# Patient Record
Sex: MALE | Race: WHITE | NOT HISPANIC OR LATINO | Employment: FULL TIME | URBAN - METROPOLITAN AREA
[De-identification: names, ages, dates, MRNs, and addresses within clinical notes are randomized per-mention and may not be internally consistent; named-entity substitution may affect disease eponyms.]

---

## 2018-03-14 ENCOUNTER — HISTORICAL (OUTPATIENT)
Dept: LAB | Facility: HOSPITAL | Age: 48
End: 2018-03-14

## 2018-03-14 LAB
ABS NEUT (OLG): 4.77 X10(3)/MCL (ref 2.1–9.2)
ALBUMIN SERPL-MCNC: 3.8 GM/DL (ref 3.4–5)
ALBUMIN/GLOB SERPL: 1.2 {RATIO}
ALP SERPL-CCNC: 27 UNIT/L (ref 50–136)
ALT SERPL-CCNC: 64 UNIT/L (ref 12–78)
AST SERPL-CCNC: 47 UNIT/L (ref 15–37)
BASOPHILS # BLD AUTO: 0 X10(3)/MCL (ref 0–0.2)
BASOPHILS NFR BLD AUTO: 1 %
BILIRUB SERPL-MCNC: 0.5 MG/DL (ref 0.2–1)
BILIRUBIN DIRECT+TOT PNL SERPL-MCNC: 0.2 MG/DL (ref 0–0.2)
BILIRUBIN DIRECT+TOT PNL SERPL-MCNC: 0.3 MG/DL (ref 0–0.8)
BUN SERPL-MCNC: 24 MG/DL (ref 7–18)
CALCIUM SERPL-MCNC: 8.6 MG/DL (ref 8.5–10.1)
CHLORIDE SERPL-SCNC: 99 MMOL/L (ref 98–107)
CO2 SERPL-SCNC: 31 MMOL/L (ref 21–32)
CREAT SERPL-MCNC: 1.35 MG/DL (ref 0.7–1.3)
EOSINOPHIL # BLD AUTO: 0.1 X10(3)/MCL (ref 0–0.9)
EOSINOPHIL NFR BLD AUTO: 2 %
ERYTHROCYTE [DISTWIDTH] IN BLOOD BY AUTOMATED COUNT: 12.9 % (ref 11.5–17)
GLOBULIN SER-MCNC: 3.1 GM/DL (ref 2.4–3.5)
GLUCOSE SERPL-MCNC: 77 MG/DL (ref 74–106)
HCT VFR BLD AUTO: 52.3 % (ref 42–52)
HGB BLD-MCNC: 16.7 GM/DL (ref 14–18)
LYMPHOCYTES # BLD AUTO: 0.6 X10(3)/MCL (ref 0.6–4.6)
LYMPHOCYTES NFR BLD AUTO: 10 %
MCH RBC QN AUTO: 30.5 PG (ref 27–31)
MCHC RBC AUTO-ENTMCNC: 31.9 GM/DL (ref 33–36)
MCV RBC AUTO: 95.6 FL (ref 80–94)
MONOCYTES # BLD AUTO: 0.6 X10(3)/MCL (ref 0.1–1.3)
MONOCYTES NFR BLD AUTO: 10 %
NEUTROPHILS # BLD AUTO: 4.77 X10(3)/MCL (ref 1.4–7.9)
NEUTROPHILS NFR BLD AUTO: 76 %
PLATELET # BLD AUTO: 352 X10(3)/MCL (ref 130–400)
PMV BLD AUTO: 10.2 FL (ref 9.4–12.4)
POTASSIUM SERPL-SCNC: 4.4 MMOL/L (ref 3.5–5.1)
PROT SERPL-MCNC: 6.9 GM/DL (ref 6.4–8.2)
PSA SERPL-MCNC: 1.03 NG/ML (ref 0–4)
RBC # BLD AUTO: 5.47 X10(6)/MCL (ref 4.7–6.1)
SODIUM SERPL-SCNC: 136 MMOL/L (ref 136–145)
WBC # SPEC AUTO: 6.2 X10(3)/MCL (ref 4.5–11.5)

## 2018-07-16 ENCOUNTER — HISTORICAL (OUTPATIENT)
Dept: SURGERY | Facility: HOSPITAL | Age: 48
End: 2018-07-16

## 2018-07-16 LAB
ABS NEUT (OLG): 6.2 X10(3)/MCL (ref 1.5–6.9)
ALBUMIN SERPL-MCNC: 4 GM/DL (ref 3.4–5)
ALBUMIN/GLOB SERPL: 1.2 RATIO
ALP SERPL-CCNC: 29 UNIT/L (ref 30–113)
ALT SERPL-CCNC: 138 UNIT/L (ref 10–45)
APTT PPP: 22.4 SECOND(S) (ref 25–35)
AST SERPL-CCNC: 48 UNIT/L (ref 15–37)
BILIRUB SERPL-MCNC: 0.7 MG/DL (ref 0.1–0.9)
BILIRUBIN DIRECT+TOT PNL SERPL-MCNC: 0.2 MG/DL (ref 0–0.3)
BILIRUBIN DIRECT+TOT PNL SERPL-MCNC: 0.5 MG/DL
BUN SERPL-MCNC: 16 MG/DL (ref 10–20)
CALCIUM SERPL-MCNC: 9.7 MG/DL (ref 8–10.5)
CHLORIDE SERPL-SCNC: 104 MMOL/L (ref 100–108)
CO2 SERPL-SCNC: 29 MMOL/L (ref 21–35)
CREAT SERPL-MCNC: 1.23 MG/DL (ref 0.7–1.3)
ERYTHROCYTE [DISTWIDTH] IN BLOOD BY AUTOMATED COUNT: 14.7 % (ref 11.5–17)
GLOBULIN SER-MCNC: 3.4 GM/DL
GLUCOSE SERPL-MCNC: 86 MG/DL (ref 75–116)
HCT VFR BLD AUTO: 54.3 % (ref 42–52)
HGB BLD-MCNC: 17.6 GM/DL (ref 14–18)
INR PPP: 1 (ref 0–1.2)
MCH RBC QN AUTO: 29 PG (ref 27–34)
MCHC RBC AUTO-ENTMCNC: 32 GM/DL (ref 31–36)
MCV RBC AUTO: 90 FL (ref 80–99)
PLATELET # BLD AUTO: 409 X10(3)/MCL (ref 140–400)
PMV BLD AUTO: 10.7 FL (ref 6.8–10)
POTASSIUM SERPL-SCNC: 4.2 MMOL/L (ref 3.6–5.2)
PROT SERPL-MCNC: 7.4 GM/DL (ref 6.4–8.2)
PROTHROMBIN TIME: 10.5 SECOND(S) (ref 9–12)
RBC # BLD AUTO: 6.01 X10(6)/MCL (ref 4.7–6.1)
SODIUM SERPL-SCNC: 140 MMOL/L (ref 135–145)
WBC # SPEC AUTO: 8.9 X10(3)/MCL (ref 4.5–11.5)

## 2018-07-19 ENCOUNTER — HISTORICAL (OUTPATIENT)
Dept: MEDSURG UNIT | Facility: HOSPITAL | Age: 48
End: 2018-07-19

## 2021-02-01 ENCOUNTER — HISTORICAL (OUTPATIENT)
Dept: RADIOLOGY | Facility: HOSPITAL | Age: 51
End: 2021-02-01

## 2021-10-28 ENCOUNTER — HISTORICAL (OUTPATIENT)
Dept: SURGERY | Facility: HOSPITAL | Age: 51
End: 2021-10-28

## 2021-10-28 LAB
APPEARANCE, UA: CLEAR
BACTERIA SPEC CULT: ABNORMAL /HPF
BILIRUB UR QL STRIP: NEGATIVE
COLOR UR: YELLOW
GLUCOSE (UA): NEGATIVE MG/DL
HGB UR QL STRIP: ABNORMAL
KETONES UR QL STRIP: NEGATIVE MG/DL
LEUKOCYTE ESTERASE UR QL STRIP: NEGATIVE
NITRITE UR QL STRIP: NEGATIVE
PH UR STRIP: 7 [PH] (ref 4.6–8)
PROT UR QL STRIP: 30 MG/DL
RBC #/AREA URNS HPF: ABNORMAL /HPF
SARS-COV-2 AG RESP QL IA.RAPID: NOT DETECTED
SP GR UR STRIP: 1.02 (ref 1–1.03)
SQUAMOUS EPITHELIAL, UA: ABNORMAL
UROBILINOGEN UR STRIP-ACNC: 1 EU/DL
WBC #/AREA URNS HPF: ABNORMAL /[HPF]

## 2021-11-02 ENCOUNTER — HISTORICAL (OUTPATIENT)
Dept: ANESTHESIOLOGY | Facility: HOSPITAL | Age: 51
End: 2021-11-02

## 2022-04-10 ENCOUNTER — HISTORICAL (OUTPATIENT)
Dept: ADMINISTRATIVE | Facility: HOSPITAL | Age: 52
End: 2022-04-10

## 2022-04-30 VITALS
BODY MASS INDEX: 31.22 KG/M2 | WEIGHT: 218.06 LBS | HEIGHT: 70 IN | DIASTOLIC BLOOD PRESSURE: 77 MMHG | SYSTOLIC BLOOD PRESSURE: 135 MMHG

## 2022-04-30 NOTE — OP NOTE
Patient:   Huang Ramsey             MRN: [id number]            FIN: [id number]               Age:   48 years     Sex:  Male     :  1970   Associated Diagnoses:   None   Author:   Pradip Zuleta MD      Date of procedure: 2018    Preoperative diagnosis: Obstructive sleep apnea.  Septal deviation.  Bilateral inferior turbinate hypertrophy.  Tonsil hypertrophy.    Postoperative diagnosis: Same    Procedure: Septoplasty, bilateral inferior turbinectomy, tonsillectomy, and uvulopalatopharyngoplasty.    Surgeon: Pradip Zuleta M.D.    Findings: Patient had bilateral inferior turbinate hypertrophy with partial obstruction of the nasal cavities.  The patient appeared to have had septoplasty previously due to scarring within portion of the nasal septum.  He did have some remnants of septal cartilage and bone and spurring of the maxillary crest which contributed to left sided nasal obstruction.  2+ tonsillar hypertrophy.  He had elongation of the soft palate and uvula with lateral webbing of the posterior tonsillar pillars.    Specimens: Septal cartilage and bone, right and left inferior turbinates, right and left palatine tonsils, and uvula and portions of the soft palate.    Estimated blood loss: Less than 50 mL    Complications: None.    Drains: None.    Anesthesia: Gen. endotracheal anesthesia    Indications: Please see history and physical exam    Procedure: Patient was brought to the operating room and placed on the operating room table in supine position after which general endotracheal anesthesia was induced.  Both inferior turbinates and the nasal septum were infiltrated with 2% lidocaine with 1-100,000 epinephrine for hemostasis.  Both inferior turbinates were also injected with saline solution for hemostasis.  Surgical patties saturated with 4% cocaine were then placed intranasally bilaterally.  Patient was then prepped and draped in sterile fashion.  The septoplasty was addressed 1st.   The surgical patties were then removed.  Examination of the nasal septum showed there to be some spurring along the anterior maxillary crest and some deviation of the nasal septum superiorly which contributed to the patient's obstruction.  A hemitransfixion incision was made on the left and a mucoperichondrial and periosteal flaps were then elevated.  As the flaps were elevated it was found that portions of the septal cartilage and bone were missing and that the mucosal flaps were coapted by fibrous adhesions.  These adhesions were divided allowing further exposure of the remnants of the septal cartilage and bone superiorly.  There was a remnant of septal cartilage attached superiorly to the perpendicular plate of the ethmoid remnant.  The septal cartilage in this area was  from the perpendicular plate of the ethmoid with caudal elevator and then a mucoperiosteal flap was elevated on the left.  The posterior portion of the upper nasal septum was also bowed obstructing the upper portion of the left anterior nasal cavity.  This portion of cartilage was removed using septal scissors taking care to leave an adequate amount of cartilage for caudal and dorsal strut.  Remnant of the perpendicular plate of the ethmoid which was contributing also to the obstruction was also removed using alligator forceps.  Next elevation of the mucoperiosteum off of the maxillary crest was carried out and the anterior portion of the maxillary crest which had these septal spurring was resected using osteotome.  The nasal cavity was examined and the obstruction from the septal deviation was relieved.  There was still slight bowing of the anterior septal cartilage but discussed with patient that it was important to leave this cartilage in place to maintain support for the tip of the nose and this area was left undisturbed.  The hemitransfixion incision was closed in a single layer using interrupted 5-0 chromic catgut suture.  The  septal flaps were then reapproximated using a running 40 plain gut horizontal mattress suture.  Next attention was turned to the inferior turbinates.  Both the right and left inferior turbinates were treated in a similar manner with findings being similar.  A straight hemostat was placed across the transition zone between the horizontal and vertical portions of the inferior turbinates and the hemostat was closed.  This was left closed for approximately 1 minute to assist with hemostasis.  The hemostats were then removed.  Turbinectomy was then carried out along the transition zone using scissors and also under direct visualization for port of the turbinectomy with a 0° endoscope.  The incision was extended anteriorly all the way to the posterior aspect of the inferior turbinate.  Inferior turbinates were then removed and submitted for pathologic evaluation.  The remnant of the inferior turbinates were then cauterized along the turbinectomy or hemostasis.  Lip was then evacuated from the nasal cavity and nasopharynx.  Next a magnetic septal splint was placed and secured across the columella with suture.  Next attention was turned to the tonsillectomy and uvulopalatopharyngoplasty.  A Myke-Farhad mouthgag was inserted into the oral cavity and opened and suspended from the [place] stand.  Red rubber catheter was passed through the right nostril and brought out through the mouth and used to retract the soft palate.  Examination of the oropharynx showed the above-mentioned findings.  Examination of the nasopharynx did not show any adenoid hypertrophy or other obstruction.  The tonsils were addressed 1st.  They were retracted medially with Allis forceps.  Incisions were then made in the anterior tonsillar pillars with plasma wand.  Coblation technique was used for the tonsillectomy and the uvulopalatopharyngoplasty.  The tonsils were removed from the pharyngeal wall using plasma wand.  Vessels were cauterized as they were  encountered.  Next an incision was made along the lower portions of the soft palate extending between the superior tonsillar fossa.  The incision was then beveled working in a anterior superior to posterior anterior direction beveling the flap.  The uvula was also resected with this portion of the soft palate and it was also beveled.  Next an incision was made in the posterior tonsillar pillar mucosa superiorly at its junction with the soft palate.  This allowed closure without tension.  The tonsillar pillars and soft palate incision were then closed in layers using interrupted 3-0 Vicryl suture to reapproximate the submucosal tissues and muscles and the mucosa was then also closed using interrupted 3-0 Vicryl suture.  Next the oral cavity and oropharynx were irrigated with saline.  Both tonsillar fossa and the incision line along the soft palate were infiltrated with 0.5% Marcaine with epinephrine.  A drip pad was then placed under the nose.  The procedure was then terminated and the patient was awoken and brought to the recovery room in stable condition.  The patient tolerated the procedure well.    CC: Dr. Pradeep Do

## 2022-04-30 NOTE — H&P
Patient:   Huang Ramsey             MRN: [id number]            FIN: [id number]               Age:   48 years     Sex:  Male     :  1970   Associated Diagnoses:   None   Author:   Pradip Zuleta MD      Patient: Huang Ramsey  YOB: 1940  Age: 48y  Gender: Male  Referring Physician: Pradeep Do MD    Chief Complaint: Sleep apnea    Historian: patient who is a good historian.     Present Illness:   48-year-old white male who was diagnosed with obstructive sleep apnea in , is referred for evaluation for surgical treatment of his sleep apnea.  The patient was initially diagnosed with obstructive sleep apnea when he had undergone a sleep study with a split night protocol on 2014.  Review of his results showed an have an apnea-hypopnea index of 24 with his lowest O2 saturation being 87%.  He had a normal sinus rhythm with an occasional premature atrial contraction.  He was eventually titrated to a continuous positive airway pressure of 12 cm on CPAP testing.  He was initially treated with CPAP but he did not tolerate it well and discontinued its use after only a few months.  He found that he would frequently awaken at night having taken the mask off.  The patient is interested in pursuing other treatment options.  In regards to symptoms of patient does snore loudly at night and it is so severe that the patient has the patient sleeping in another room separate from her.  She can hear him snoring from several rooms over.  The patient will usually go to bed at 8:52 PM and is usually up at 6 AM in the morning.  Frequently when he awakens he is not well rested.  He finds that at lunch she will sometimes have to take a nap.  His wife has noticed that the patient does have periods of apnea.  The patient does have problems with daytime somnolence.  Sometimes when driving he will become sleepy on a longer road trip such as driving from here to [place].  If she becomes sleepy  will pull over on the side of the road and take a 15-20 minute nap.  At one time he did this he had slept for approximately 2 hours.  Sometimes this sleepiness will affect his ability to work.  Patient does find that he is irritable at times.  He does not have problems with headaches.  His weight has been stable ranging from 210-215 pounds.  The patient does have problems with chronic nasal congestion which has been present for several years.  This is worse during the spring.  He does have a history of nasal trauma for which he had undergone repair in 1993.  In the past he had used an over-the-counter decongestant nasal spray fairly frequently because of this congestion.  He did notice that once he was able to stop it that his breathing had improved.  He is still having problems with congestion, however.  The patient does not have a history of tonsillitis.  He does exercise frequently.  He does not use tobacco.  He drinks alcohol infrequently.  He does not have a history of heart disease but does have a history of hypertension which was diagnosed earlier this year.    Past Medical History:  High blood pressure.       Past Surgical History:   Appendectomy.   rotator cuff sx 2014  ACL Repair 2016  Bicep reattachment 2007    Current Medications:  Performed Reconciliation  Active Medications   Valsartan/HCTZ 320mg/25mg. Take 1 tablet(s) By Mouth Daily. Do not substitute. (null).   Lexapro  . Take 1 20mg tablet(s) By Mouth Daily. Do not substitute. (null).      Allergies:  Performed Reconciliation   Penicillin     Review of Systems:  Review of systems is unremarkable except as mentioned above.     Social History:  Patient has never used tobacco. Patient drinks 2-3 beers a weekPatient denies the use of illicit drugs. Patient is currently employed. Patient is .     Family History:  There is no family history of bleeding diathesis.   There is no family history of anesthetic complications.   There is a family  history of heart disease.   There is a family history of hypertension.   History of cancer.     Vitals:   Weight: 221.0 lbs  Temperature: 99.1° F  Heart rate: 100 bpm  Blood pressure: 120 / 73 mmHg      Physical Exam:  General: Well developed & well nourished male in no acute distress. Voice is normal.   Head & Face: Normocephalic without any apparent skin cancer, facial swelling, masses, or erythema.   Ears:  Auricles are normally developed & without lesions, externally auditory canals are normal, & both tympanic membranes are non-erythematous. No middle ear effusions.   Nose: Nasal dorsum is unremarkable. Nasal septum deviates to the left.   Right nasal cavity: Inferior turbinate is hypertrophic.   Left nasal cavity: Inferior turbinate is hypertrophic.   Oral cavity & oropharynx: Floor of mouth is unremarkable.Hard palate is normal. Tongue is slightly enlarged.  The patient does have elongation of the soft palate and uvula with webbing of the posterior tonsillar pillars bilaterally.  2+ tonsillar hypertrophy bilaterally.  Neck: Neck is supple without adenopathy, thyromegaly, swelling or tenderness. Trachea is in the midline. Parotid & submandibular glands are non-tender & without swelling, tenderness, or masses.   Chest:  Clear to auscultation. No adventitial sounds.   Cardiovascular: Regular rate & rhythm. No murmurs.   Abdomen: Non-distended.   Extremities: No cyanosis, clubbing , or edema.   Eyes: Extraocular muscles are intact.   Neurologic: Alert & oriented. Cranial nerves 2- 12 are grossly normal.     Assessment:  Obstructive sleep apnea.  Septal deviation and bilateral inferior turbinate hypertrophy.    Plan:  Treatment options were discussed with the patient.  From a surgical standpoint and believe he would benefit from septoplasty, turbinectomy, tonsillectomy, and uvulopalatopharyngoplasty.  Risks of this procedure and complications were discussed.  Was discussed with the patient that I believe that this  will help his sleep apnea but may not completely eliminated.  Also believe it would help his snoring but that it may not completely eliminated.  The patient does understand this and would like to proceed with surgery.  Surgery is tentatively scheduled for 07/19/2018.

## 2022-04-30 NOTE — OP NOTE
Patient:   Huang Ramsey            MRN: [id number]            FIN: [id number]               Age:   51 years     Sex:  Male     :  1970   Associated Diagnoses:   Calculus of gallbladder with chronic cholecystitis without obstruction   Author:   Sumi Hurtado MD      Operative Note   Operative Information   Date/ Time:  2021 14:11:00.     Procedures Performed: Procedure Code   Laparoscopy, surgical; cholecystectomy (39373)..     Indications: 51-year-old white male with pain in the right upper quadrant and symptoms consistent with chronic cholecystitis and imaging evidence consistent with chronic cholecystitis and gallstones elected to undergo cholecystectomy.     Preoperative Diagnosis: Calculus of gallbladder with chronic cholecystitis without obstruction (DKX17-VI K80.10).     Postoperative Diagnosis: Calculus of gallbladder with chronic cholecystitis without obstruction ([id number] K80.10).     Surgeon: Sumi Hurtado MD.     Anesthesia: General.     Speciman Removed: Gallbladder.     Description of Procedure/Findings/    Complications: After the proper consent was obtained patient was brought to the operating theater laid in the supine position general endotracheal intubation and anesthesia was performed.  An NG tube was placed prior to beginning the procedure and the abdomen was sterilely prepped and draped in normal surgical fashion.  An incision was made in the supraumbilical region after infiltration 1% lidocaine and epinephrine using a #15 blade.  Dissection was carried down to the level of the fascia and the abdomen was entered carefully using a  Veress needle.  The abdomen was then insufflated to 15 mmHg,  a 5 mm Visiport was used to enter the abdomen and the abdominal contents were visually inspected.  Under direct visualization secondary trochars were inserted into the abdomen, a 5 mm trocar was inserted in the subxiphoid region and 2 5 mm trochars were inserted and the right  quadrant.  There were no injuries noted during insertion of the trochars.  The patient was then placed in reverse Trendelenburg position with right side up.  There were firm adhesions between the gallbladder and omentum which were lysed sharply.   The dome of the gallbladder was then grasped with atraumatic grasper through the most lateral port and retracted over the dome of the liver the infundibulum was then grasped using an atraumatic grasper through the midclavicular port and retracted to the right lower quadrant.  This was able to expose the triangle of calot.  The peritoneum overlying the gallbladder infundibulum was then incised and the cystic duct and artery were identified and circumferentially dissected carefully.   This created the critical view of the cystic duct and artery entering clearly into the gallbladder with the liver in the background.   The cystic duct and cystic artery were then doubly clipped and divided close to the gallbladder. The gallbladder was then dissected from its peritoneal attachments using electrocautery.   Hemostasis was checked and the gallbladder was placed within an endoscopic retrieval bag and removed through the subxiphoid port.  The gallbladder was then passed off the table as a specimen.  The gallbladder fossa was irrigated with saline to assure hemostasis.  No evidence of bleeding from the fossa or the cystic artery were identified.  There was no leakage noted from the cystic stump.  Secondary trochars were then removed under direct visualization.  There was no noted bleeding at any of the trocar sites.  The laparoscope was then withdrawn through the umbilical port and insufflation was allowed to escape.  The fascia of the subxiphoid port were closed with a simple figure-of-eight 0 Vicryl suture.  The skin was closed in a subcuticular fashion using 4-0 Monocryl in a sterile dressing was placed upon the skin.  The patient tolerated the procedure well and was transferred to  the postanesthesia care unit in stable condition..     Esimated blood loss: loss  5  cc.     Complications: None.

## 2023-09-29 ENCOUNTER — HOSPITAL ENCOUNTER (OUTPATIENT)
Dept: RADIOLOGY | Facility: HOSPITAL | Age: 53
Discharge: HOME | End: 2023-09-29
Attending: FAMILY MEDICINE
Payer: COMMERCIAL

## 2023-09-29 DIAGNOSIS — R06.02 SHORTNESS OF BREATH: ICD-10-CM

## 2023-09-29 PROCEDURE — 71046 X-RAY EXAM CHEST 2 VIEWS: CPT | Mod: TC

## 2023-10-22 ENCOUNTER — HOSPITAL ENCOUNTER (INPATIENT)
Facility: HOSPITAL | Age: 53
LOS: 4 days | Discharge: STILL ADMITTED OR ADMINISTRATIVE | DRG: 291 | End: 2023-10-28
Attending: EMERGENCY MEDICINE | Admitting: EMERGENCY MEDICINE
Payer: COMMERCIAL

## 2023-10-22 DIAGNOSIS — R00.0 TACHYCARDIA: ICD-10-CM

## 2023-10-22 DIAGNOSIS — R79.89 ELEVATED TROPONIN I MEASUREMENT: ICD-10-CM

## 2023-10-22 DIAGNOSIS — I50.9 CHF (CONGESTIVE HEART FAILURE): ICD-10-CM

## 2023-10-22 DIAGNOSIS — R06.00 DYSPNEA: ICD-10-CM

## 2023-10-22 DIAGNOSIS — R00.9 ABNORMAL HEART RATE: ICD-10-CM

## 2023-10-22 DIAGNOSIS — I50.9 CONGESTIVE HEART FAILURE, UNSPECIFIED HF CHRONICITY, UNSPECIFIED HEART FAILURE TYPE: Primary | ICD-10-CM

## 2023-10-22 LAB
BASOPHILS # BLD AUTO: 0.06 X10(3)/MCL
BASOPHILS NFR BLD AUTO: 0.7 %
EOSINOPHIL # BLD AUTO: 0.31 X10(3)/MCL (ref 0–0.9)
EOSINOPHIL NFR BLD AUTO: 3.6 %
ERYTHROCYTE [DISTWIDTH] IN BLOOD BY AUTOMATED COUNT: 13.8 % (ref 11.5–17)
HCT VFR BLD AUTO: 53 % (ref 42–52)
HGB BLD-MCNC: 17.6 G/DL (ref 14–18)
IMM GRANULOCYTES # BLD AUTO: 0.02 X10(3)/MCL (ref 0–0.04)
IMM GRANULOCYTES NFR BLD AUTO: 0.2 %
LYMPHOCYTES # BLD AUTO: 1.75 X10(3)/MCL (ref 0.6–4.6)
LYMPHOCYTES NFR BLD AUTO: 20.2 %
MCH RBC QN AUTO: 31.6 PG (ref 27–31)
MCHC RBC AUTO-ENTMCNC: 33.2 G/DL (ref 33–36)
MCV RBC AUTO: 95.2 FL (ref 80–94)
MONOCYTES # BLD AUTO: 0.75 X10(3)/MCL (ref 0.1–1.3)
MONOCYTES NFR BLD AUTO: 8.7 %
NEUTROPHILS # BLD AUTO: 5.78 X10(3)/MCL (ref 2.1–9.2)
NEUTROPHILS NFR BLD AUTO: 66.6 %
PLATELET # BLD AUTO: 215 X10(3)/MCL (ref 130–400)
PMV BLD AUTO: 10.6 FL (ref 7.4–10.4)
RBC # BLD AUTO: 5.57 X10(6)/MCL (ref 4.7–6.1)
WBC # SPEC AUTO: 8.67 X10(3)/MCL (ref 4.5–11.5)

## 2023-10-22 PROCEDURE — 99285 EMERGENCY DEPT VISIT HI MDM: CPT | Mod: 25

## 2023-10-22 PROCEDURE — 84484 ASSAY OF TROPONIN QUANT: CPT | Performed by: EMERGENCY MEDICINE

## 2023-10-22 PROCEDURE — 82550 ASSAY OF CK (CPK): CPT | Performed by: EMERGENCY MEDICINE

## 2023-10-22 PROCEDURE — 93010 ELECTROCARDIOGRAM REPORT: CPT | Mod: ,,, | Performed by: STUDENT IN AN ORGANIZED HEALTH CARE EDUCATION/TRAINING PROGRAM

## 2023-10-22 PROCEDURE — 63600175 PHARM REV CODE 636 W HCPCS: Performed by: EMERGENCY MEDICINE

## 2023-10-22 PROCEDURE — 80048 BASIC METABOLIC PNL TOTAL CA: CPT | Performed by: EMERGENCY MEDICINE

## 2023-10-22 PROCEDURE — 93005 ELECTROCARDIOGRAM TRACING: CPT

## 2023-10-22 PROCEDURE — 82553 CREATINE MB FRACTION: CPT | Performed by: EMERGENCY MEDICINE

## 2023-10-22 PROCEDURE — 25000003 PHARM REV CODE 250: Performed by: EMERGENCY MEDICINE

## 2023-10-22 PROCEDURE — 93010 EKG 12-LEAD: ICD-10-PCS | Mod: ,,, | Performed by: STUDENT IN AN ORGANIZED HEALTH CARE EDUCATION/TRAINING PROGRAM

## 2023-10-22 PROCEDURE — 85379 FIBRIN DEGRADATION QUANT: CPT | Performed by: EMERGENCY MEDICINE

## 2023-10-22 PROCEDURE — 83880 ASSAY OF NATRIURETIC PEPTIDE: CPT | Performed by: EMERGENCY MEDICINE

## 2023-10-22 PROCEDURE — 96374 THER/PROPH/DIAG INJ IV PUSH: CPT

## 2023-10-22 PROCEDURE — 85025 COMPLETE CBC W/AUTO DIFF WBC: CPT | Performed by: EMERGENCY MEDICINE

## 2023-10-22 PROCEDURE — 83735 ASSAY OF MAGNESIUM: CPT | Performed by: EMERGENCY MEDICINE

## 2023-10-22 PROCEDURE — 96375 TX/PRO/DX INJ NEW DRUG ADDON: CPT

## 2023-10-22 RX ORDER — ASPIRIN 325 MG
325 TABLET ORAL
Status: COMPLETED | OUTPATIENT
Start: 2023-10-22 | End: 2023-10-22

## 2023-10-22 RX ORDER — LORAZEPAM 2 MG/ML
1 INJECTION INTRAMUSCULAR
Status: COMPLETED | OUTPATIENT
Start: 2023-10-22 | End: 2023-10-22

## 2023-10-22 RX ADMIN — ASPIRIN 325 MG ORAL TABLET 325 MG: 325 PILL ORAL at 11:10

## 2023-10-22 RX ADMIN — LORAZEPAM 1 MG: 2 INJECTION INTRAMUSCULAR; INTRAVENOUS at 11:10

## 2023-10-23 LAB
ANION GAP SERPL CALC-SCNC: 14 MEQ/L
APPEARANCE UR: CLEAR
BACTERIA #/AREA URNS AUTO: NORMAL /HPF
BILIRUB UR QL STRIP.AUTO: NEGATIVE
BNP BLD-MCNC: 445.2 PG/ML
BUN SERPL-MCNC: 32 MG/DL (ref 8.4–25.7)
CALCIUM SERPL-MCNC: 9.2 MG/DL (ref 8.4–10.2)
CHLORIDE SERPL-SCNC: 106 MMOL/L (ref 98–107)
CK MB SERPL-MCNC: 4.9 NG/ML
CK SERPL-CCNC: 500 U/L (ref 30–200)
CO2 SERPL-SCNC: 22 MMOL/L (ref 22–29)
COLOR UR AUTO: YELLOW
CREAT SERPL-MCNC: 1.54 MG/DL (ref 0.73–1.18)
CREAT/UREA NIT SERPL: 21
D DIMER PPP IA.FEU-MCNC: 1.01 UG/ML FEU (ref 0–0.5)
ERYTHROCYTE [SEDIMENTATION RATE] IN BLOOD: 13 MM/HR (ref 0–15)
GFR SERPLBLD CREATININE-BSD FMLA CKD-EPI: 54 MLS/MIN/1.73/M2
GLUCOSE SERPL-MCNC: 120 MG/DL (ref 74–100)
GLUCOSE UR QL STRIP.AUTO: NEGATIVE
KETONES UR QL STRIP.AUTO: NEGATIVE
LEUKOCYTE ESTERASE UR QL STRIP.AUTO: NEGATIVE
MAGNESIUM SERPL-MCNC: 1.7 MG/DL (ref 1.6–2.6)
NITRITE UR QL STRIP.AUTO: NEGATIVE
PH UR STRIP.AUTO: 6 [PH]
POCT GLUCOSE: 93 MG/DL (ref 70–110)
POTASSIUM SERPL-SCNC: 4.1 MMOL/L (ref 3.5–5.1)
PROT UR QL STRIP.AUTO: ABNORMAL
RBC #/AREA URNS AUTO: NORMAL /HPF
RBC UR QL AUTO: ABNORMAL
SODIUM SERPL-SCNC: 142 MMOL/L (ref 136–145)
SP GR UR STRIP.AUTO: 1.01 (ref 1–1.03)
SQUAMOUS #/AREA URNS AUTO: NORMAL /HPF
TROPONIN I SERPL-MCNC: 0.16 NG/ML (ref 0–0.04)
TROPONIN I SERPL-MCNC: 0.17 NG/ML (ref 0–0.04)
UROBILINOGEN UR STRIP-ACNC: 0.2
WBC #/AREA URNS AUTO: NORMAL /HPF

## 2023-10-23 PROCEDURE — G0378 HOSPITAL OBSERVATION PER HR: HCPCS

## 2023-10-23 PROCEDURE — 85652 RBC SED RATE AUTOMATED: CPT | Performed by: NURSE PRACTITIONER

## 2023-10-23 PROCEDURE — 93010 EKG 12-LEAD: ICD-10-PCS | Mod: ,,, | Performed by: STUDENT IN AN ORGANIZED HEALTH CARE EDUCATION/TRAINING PROGRAM

## 2023-10-23 PROCEDURE — 63600175 PHARM REV CODE 636 W HCPCS: Performed by: FAMILY MEDICINE

## 2023-10-23 PROCEDURE — 99900035 HC TECH TIME PER 15 MIN (STAT)

## 2023-10-23 PROCEDURE — 81001 URINALYSIS AUTO W/SCOPE: CPT | Performed by: EMERGENCY MEDICINE

## 2023-10-23 PROCEDURE — 25000003 PHARM REV CODE 250: Performed by: NURSE PRACTITIONER

## 2023-10-23 PROCEDURE — 93010 ELECTROCARDIOGRAM REPORT: CPT | Mod: ,,, | Performed by: STUDENT IN AN ORGANIZED HEALTH CARE EDUCATION/TRAINING PROGRAM

## 2023-10-23 PROCEDURE — 86140 C-REACTIVE PROTEIN: CPT | Performed by: NURSE PRACTITIONER

## 2023-10-23 PROCEDURE — 84484 ASSAY OF TROPONIN QUANT: CPT | Performed by: FAMILY MEDICINE

## 2023-10-23 PROCEDURE — 93005 ELECTROCARDIOGRAM TRACING: CPT

## 2023-10-23 PROCEDURE — 25000003 PHARM REV CODE 250: Performed by: FAMILY MEDICINE

## 2023-10-23 PROCEDURE — 25000003 PHARM REV CODE 250: Performed by: EMERGENCY MEDICINE

## 2023-10-23 PROCEDURE — 25500020 PHARM REV CODE 255: Performed by: EMERGENCY MEDICINE

## 2023-10-23 PROCEDURE — 27000221 HC OXYGEN, UP TO 24 HOURS

## 2023-10-23 PROCEDURE — 94761 N-INVAS EAR/PLS OXIMETRY MLT: CPT

## 2023-10-23 PROCEDURE — C9113 INJ PANTOPRAZOLE SODIUM, VIA: HCPCS | Performed by: FAMILY MEDICINE

## 2023-10-23 PROCEDURE — 63600175 PHARM REV CODE 636 W HCPCS: Performed by: EMERGENCY MEDICINE

## 2023-10-23 RX ORDER — POTASSIUM CHLORIDE 750 MG/1
10 TABLET, EXTENDED RELEASE ORAL
Status: ON HOLD | COMMUNITY
Start: 2023-10-06 | End: 2023-11-01 | Stop reason: HOSPADM

## 2023-10-23 RX ORDER — BUSPIRONE HYDROCHLORIDE 30 MG/1
30 TABLET ORAL 2 TIMES DAILY
COMMUNITY

## 2023-10-23 RX ORDER — SODIUM CHLORIDE 0.9 % (FLUSH) 0.9 %
10 SYRINGE (ML) INJECTION
Status: DISCONTINUED | OUTPATIENT
Start: 2023-10-23 | End: 2023-10-28 | Stop reason: HOSPADM

## 2023-10-23 RX ORDER — ANASTROZOLE 1 MG/1
1 TABLET ORAL DAILY
Status: DISCONTINUED | OUTPATIENT
Start: 2023-10-23 | End: 2023-10-25

## 2023-10-23 RX ORDER — AMLODIPINE BESYLATE 5 MG/1
5 TABLET ORAL DAILY
Status: DISCONTINUED | OUTPATIENT
Start: 2023-10-23 | End: 2023-10-24

## 2023-10-23 RX ORDER — SERTRALINE HYDROCHLORIDE 50 MG/1
100 TABLET, FILM COATED ORAL DAILY
Status: DISCONTINUED | OUTPATIENT
Start: 2023-10-23 | End: 2023-10-24

## 2023-10-23 RX ORDER — ALPRAZOLAM 0.25 MG/1
0.25 TABLET ORAL ONCE
Status: COMPLETED | OUTPATIENT
Start: 2023-10-23 | End: 2023-10-23

## 2023-10-23 RX ORDER — DIAZEPAM 5 MG/1
5 TABLET ORAL EVERY 8 HOURS PRN
Status: DISCONTINUED | OUTPATIENT
Start: 2023-10-23 | End: 2023-10-23

## 2023-10-23 RX ORDER — DIAZEPAM 5 MG/1
5 TABLET ORAL DAILY PRN
Status: DISCONTINUED | OUTPATIENT
Start: 2023-10-23 | End: 2023-10-24

## 2023-10-23 RX ORDER — DIAZEPAM 5 MG/1
5 TABLET ORAL DAILY PRN
COMMUNITY
Start: 2023-10-10

## 2023-10-23 RX ORDER — SERTRALINE HYDROCHLORIDE 100 MG/1
TABLET, FILM COATED ORAL
COMMUNITY
Start: 2023-09-06

## 2023-10-23 RX ORDER — FUROSEMIDE 10 MG/ML
40 INJECTION INTRAMUSCULAR; INTRAVENOUS
Status: COMPLETED | OUTPATIENT
Start: 2023-10-23 | End: 2023-10-23

## 2023-10-23 RX ORDER — PANTOPRAZOLE SODIUM 40 MG/10ML
40 INJECTION, POWDER, LYOPHILIZED, FOR SOLUTION INTRAVENOUS DAILY
Status: DISCONTINUED | OUTPATIENT
Start: 2023-10-23 | End: 2023-10-26

## 2023-10-23 RX ORDER — POTASSIUM CHLORIDE 750 MG/1
10 TABLET, EXTENDED RELEASE ORAL DAILY
Status: DISCONTINUED | OUTPATIENT
Start: 2023-10-23 | End: 2023-10-25

## 2023-10-23 RX ORDER — AMLODIPINE BESYLATE 5 MG/1
5 TABLET ORAL
Status: ON HOLD | COMMUNITY
Start: 2023-10-19 | End: 2023-11-01 | Stop reason: HOSPADM

## 2023-10-23 RX ORDER — ASPIRIN 81 MG/1
81 TABLET ORAL
COMMUNITY
Start: 2021-10-19

## 2023-10-23 RX ORDER — TALC
6 POWDER (GRAM) TOPICAL NIGHTLY PRN
Status: DISCONTINUED | OUTPATIENT
Start: 2023-10-23 | End: 2023-10-28 | Stop reason: HOSPADM

## 2023-10-23 RX ORDER — HYDROCHLOROTHIAZIDE 25 MG/1
25 TABLET ORAL
Status: ON HOLD | COMMUNITY
Start: 2021-10-15 | End: 2023-11-01 | Stop reason: HOSPADM

## 2023-10-23 RX ORDER — ASPIRIN 81 MG/1
81 TABLET ORAL ONCE
Status: COMPLETED | OUTPATIENT
Start: 2023-10-23 | End: 2023-10-23

## 2023-10-23 RX ORDER — BUSPIRONE HYDROCHLORIDE 5 MG/1
30 TABLET ORAL 2 TIMES DAILY
Status: DISCONTINUED | OUTPATIENT
Start: 2023-10-23 | End: 2023-10-23

## 2023-10-23 RX ORDER — METOPROLOL TARTRATE 25 MG/1
25 TABLET, FILM COATED ORAL 2 TIMES DAILY
Status: DISCONTINUED | OUTPATIENT
Start: 2023-10-23 | End: 2023-10-24

## 2023-10-23 RX ORDER — ANASTROZOLE 1 MG/1
1 TABLET ORAL
Status: ON HOLD | COMMUNITY
Start: 2021-10-15 | End: 2023-11-01 | Stop reason: HOSPADM

## 2023-10-23 RX ADMIN — ASPIRIN 81 MG: 81 TABLET, COATED ORAL at 11:10

## 2023-10-23 RX ADMIN — METOPROLOL TARTRATE 25 MG: 25 TABLET, FILM COATED ORAL at 04:10

## 2023-10-23 RX ADMIN — Medication 6 MG: at 08:10

## 2023-10-23 RX ADMIN — FUROSEMIDE 40 MG: 10 INJECTION, SOLUTION INTRAMUSCULAR; INTRAVENOUS at 01:10

## 2023-10-23 RX ADMIN — AMLODIPINE BESYLATE 5 MG: 5 TABLET ORAL at 11:10

## 2023-10-23 RX ADMIN — DIAZEPAM 5 MG: 5 TABLET ORAL at 02:10

## 2023-10-23 RX ADMIN — ANASTROZOLE 1 MG: 1 TABLET ORAL at 11:10

## 2023-10-23 RX ADMIN — IOPAMIDOL 100 ML: 755 INJECTION, SOLUTION INTRAVENOUS at 12:10

## 2023-10-23 RX ADMIN — POTASSIUM CHLORIDE 10 MEQ: 750 TABLET, FILM COATED, EXTENDED RELEASE ORAL at 11:10

## 2023-10-23 RX ADMIN — PANTOPRAZOLE SODIUM 40 MG: 40 INJECTION, POWDER, FOR SOLUTION INTRAVENOUS at 10:10

## 2023-10-23 RX ADMIN — ALPRAZOLAM 0.25 MG: 0.25 TABLET ORAL at 11:10

## 2023-10-23 RX ADMIN — BUSPIRONE HYDROCHLORIDE 30 MG: 5 TABLET ORAL at 11:10

## 2023-10-23 RX ADMIN — DIAZEPAM 5 MG: 5 TABLET ORAL at 10:10

## 2023-10-23 RX ADMIN — SERTRALINE HYDROCHLORIDE 100 MG: 50 TABLET ORAL at 11:10

## 2023-10-23 NOTE — ED PROVIDER NOTES
"ED PROVIDER NOTE  10/22/2023    CHIEF COMPLAINT:   Chief Complaint   Patient presents with    Shortness of Breath     Pt states that he has been having this feeling of having to "breath heavy" and getting anxious for that started a couple of weeks back and it went away but it came back and got worse the past couple of days. States that the breathing is a little harder when he lays down. Denies any chest pain and only history is htn and a leaky valve.       HISTORY OF PRESENT ILLNESS:   Huang Ramsey is a 53 y.o. male who presents with chief complaint Shortness of breath. Reports having shortness of breath worsening over the past 2 months especially with lying down.  He states that he noticed his whenever he exercises if he has to lay down for an exercise like bench pressing he will get short of breath and at night he gets short of breath when he lays down to sleep staying he feels like he has to take deep breath.  He also reports that over the past couple of days he was felt very anxious and can not stop thinking that something is wrong with his heart.  He had some outpatient labs done a month ago which showed that he would had some damage to his heart and followed up with the cardiologist and is supposed to have a nuclear stress test.  He reports that he does take exogenous testosterone.  Denies having any chest pain or hemoptysis or lower extremity swelling.    The history is provided by the patient.         REVIEW OF SYSTEMS: as noted in the HPI.  NURSING NOTES REVIEWED      PAST MEDICAL/SURGICAL HISTORY:   Past Medical History:   Diagnosis Date    Hypertension     History reviewed. No pertinent surgical history.    FAMILY HISTORY: History reviewed. No pertinent family history.    SOCIAL HISTORY:      ALLERGIES:   Review of patient's allergies indicates:   Allergen Reactions    Penicillins        PHYSICAL EXAM:  Initial Vitals [10/22/23 2326]   BP Pulse Resp Temp SpO2   (!) 140/87 (!) 117 18 98.6 °F (37 " °C) 97 %      MAP       --         Physical Exam    Nursing note and vitals reviewed.  Constitutional: He appears well-developed and well-nourished. No distress.   HENT:   Head: Normocephalic and atraumatic.   Nose: Nose normal.   Mouth/Throat: Oropharynx is clear and moist and mucous membranes are normal.   Eyes: Conjunctivae and EOM are normal. Pupils are equal, round, and reactive to light.   Neck: Neck supple. No tracheal deviation present.   Cardiovascular:  Regular rhythm, normal heart sounds, intact distal pulses and normal pulses.   Tachycardia present.         Pulmonary/Chest: Effort normal and breath sounds normal. No respiratory distress.   Abdominal: Abdomen is soft. There is no abdominal tenderness. There is no rebound and no guarding.   Musculoskeletal:         General: Normal range of motion.      Cervical back: Neck supple.     Neurological: He is alert and oriented to person, place, and time. GCS eye subscore is 4. GCS verbal subscore is 5. GCS motor subscore is 6.   CN II-XII intact. Moves all extremities. No gross sensory or motor deficits.   Skin: Skin is warm, dry and intact.   Psychiatric: His speech is normal and behavior is normal. Judgment and thought content normal. His mood appears anxious. Cognition and memory are normal.         RESULTS:  Labs Reviewed   BASIC METABOLIC PANEL - Abnormal; Notable for the following components:       Result Value    Glucose Level 120 (*)     Blood Urea Nitrogen 32.0 (*)     Creatinine 1.54 (*)     All other components within normal limits   B-TYPE NATRIURETIC PEPTIDE - Abnormal; Notable for the following components:    Natriuretic Peptide 445.2 (*)     All other components within normal limits   TROPONIN I - Abnormal; Notable for the following components:    Troponin-I 0.168 (*)     All other components within normal limits   CBC WITH DIFFERENTIAL - Abnormal; Notable for the following components:    Hct 53.0 (*)     MCV 95.2 (*)     MCH 31.6 (*)     MPV  10.6 (*)     All other components within normal limits   D DIMER, QUANTITATIVE - Abnormal; Notable for the following components:    D-Dimer 1.01 (*)     All other components within normal limits   CK - Abnormal; Notable for the following components:    Creatine Kinase 500 (*)     All other components within normal limits   URINALYSIS, REFLEX TO URINE CULTURE - Abnormal; Notable for the following components:    Protein, UA 2+ (*)     Blood, UA Trace-Lysed (*)     All other components within normal limits   MAGNESIUM - Normal   URINALYSIS, MICROSCOPIC - Normal   CBC W/ AUTO DIFFERENTIAL    Narrative:     The following orders were created for panel order CBC auto differential.  Procedure                               Abnormality         Status                     ---------                               -----------         ------                     CBC with Differential[[id number]]        Abnormal            Final result                 Please view results for these tests on the individual orders.   CK-MB     Imaging Results              CTA Chest Non-Coronary (PE Studies) (Preliminary result)  Result time 10/23/23 00:50:14      Preliminary result by Jai Josue MD (10/23/23 00:50:14)                   Narrative:    START OF REPORT:  Technique: CT Scan of the chest was performed with intravenous contrast with direct axial images as well as sagittal and coronal reconstruction images pulmonary embolus protocol.    Dosage Information: Automated Exposure Control was utilized 355.95 mGy.cm.    Comparison: None.    Clinical History: Sob chest pain.    Findings:  Soft Tissues: Unremarkable.  Neck: The thyroid gland appear unremarkable.  Mediastinum: The mediastinal structures are within normal limits.  Heart: Mild cardiomegaly is seen.  Aorta: Mild aortic calcification is seen in the thoracic aorta.  Pulmonary Arteries: No filling defects are seen in the pulmonary arteries to suggest pulmonary embolus.  Lungs: No acute  focal infiltrate or consolidation is seen. A 6 mm calcified nodule is seen in the left upper lobe (series 4; image 55). There is mild diffuse interlobular interstitial septal thickening with ground-glass opacities in both lungs. These findings may reflect early interstitial edema.  Pleura: Trace right pleural effusion is seen. No pneumothorax is identified.  Bony Structures:  Spine: Mild spondylolytic changes are seen in the thoracic spine.  Abdomen: Surgical clips are seen in the right upper quadrant suggesting prior cholecystectomy. There is a 5 cm exophytic cortical in the upper pole of the left kidney. The other visualized upper abdominal organs appear unremarkable.      Impression:  1. No filling defects are seen in the pulmonary arteries to suggest pulmonary embolus.  2. No acute focal infiltrate or consolidation is seen. There is mild diffuse interlobular interstitial septal thickening with ground-glass opacities in both lungs. These findings may reflect early interstitial edema. Correlate with clinical and laboratory findings.  3. Trace right pleural effusion is seen.  4. Details and other findings as discussed above.                                         X-Ray Chest AP Portable (Preliminary result)  Result time 10/23/23 00:04:33      Wet Read by Michael Mclaughlin DO (10/23/23 00:04:33, [facility] - Emergency Dept, Emergency Medicine)    Cardiomegaly. No dense lobar consolidation or pneumothorax.                                    PROCEDURES:  Critical Care    Date/Time: 10/23/2023 2:00 AM    Performed by: Michael Mclaughlin DO  Authorized by: Pradeep Do MD  Direct patient critical care time: 15 minutes  Additional history critical care time: 4 minutes  Ordering / reviewing critical care time: 5 minutes  Documentation critical care time: 5 minutes  Total critical care time (exclusive of procedural time) : 29 minutes  Critical care time was exclusive of separately billable procedures and  treating other patients.  Critical care was necessary to treat or prevent imminent or life-threatening deterioration of the following conditions: cardiac failure.  Critical care was time spent personally by me on the following activities: development of treatment plan with patient or surrogate, discussions with consultants, interpretation of cardiac output measurements, evaluation of patient's response to treatment, examination of patient, obtaining history from patient or surrogate, ordering and performing treatments and interventions, ordering and review of laboratory studies, ordering and review of radiographic studies, pulse oximetry, re-evaluation of patient's condition and review of old charts.          ECG:  EKG Readings: (Independently Interpreted)   Initial Reading: No STEMI. Rhythm: Sinus Tachycardia. Heart Rate: 120. Ectopy: No Ectopy. Conduction: RBBB. Axis: Left Axis Deviation.       ED COURSE AND MEDICAL DECISION MAKING:  Medications   diazePAM tablet 5 mg (has no administration in time range)   aspirin tablet 325 mg (325 mg Oral Given 10/22/23 2337)   LORazepam injection 1 mg (1 mg Intravenous Given 10/22/23 2349)   iopamidoL (ISOVUE-370) injection 100 mL (100 mLs Intravenous Given 10/23/23 0039)   furosemide injection 40 mg (40 mg Intravenous Given 10/23/23 0123)     ED Course as of 10/23/23 0229   Mon Oct 23, 2023   0002 D-Dimer(!): 1.01 [IB]   0002 WBC: 8.67 [IB]   0002 Hemoglobin: 17.6 [IB]   0002 Platelet Count: 215 [IB]   0010 Creatinine(!): 1.54  Increased from 1.21 on 9/29/23 but was 1.52 back in 2021. [IB]   0010 BUN(!): 32.0 [IB]   0010 Magnesium : 1.70 [IB]   0018 Troponin I(!): 0.168  0.142 back on 9/29/23 [IB]   0018 BNP(!): 445.2 [IB]   0226 CPK(!): 500 [IB]      ED Course User Index  [IB] Michael Mclaughlin, DO        Medical Decision Making  53-year-old male who presents with orthopnea and paroxysmal nocturnal dyspnea that has been getting worse over the past couple of months, denies  having any chest pain or hemoptysis.  He was getting worked up outpatient for this and reports having a recent echocardiogram although I am not able to see the results.  He did have an elevated troponin level back in September.  Cardiac workup was initiated.  ECG shows sinus tachycardia with right bundle-branch block and left axis deviation, no STEMI.  Initial troponin elevated at 0.168.  .  CBC shows no leukocytosis or leukopenia or anemia.  BMP shows no significant electrolyte abnormalities with creatinine 1.54 which is up from 1.21 on most recent labs but has been as high as 1.52 in the past.  D-dimer elevated at 1.01 so CTA chest was obtained to evaluate for PE, this was negative for PE but did show interstitial edema.  Given 40 mg Lasix IV along with aspirin and 1 mg of Ativan for his anxiety.  Case discussed with his PCP who also recommended checking CPK and CK-MB and we will admit for further medical evaluation or treatment.  I have spoken with the patient and/or caregivers. I have explained the patient's condition, diagnoses and treatment plan based on the information available to me at this time. I have answered the patient's and/or caregiver's questions and addressed any concerns. The patient and/or caregivers have as good an understanding of the patient's diagnosis, condition and treatment plan as can be expected at this point. The patient has been stabilized within the capability of the emergency department. The patient will be transported for further care and management or will be moved to an observation or inpatient service. I have communicated with the staff or medical practitioner taking over this patient's care.    Amount and/or Complexity of Data Reviewed  External Data Reviewed: labs and notes.  Labs: ordered. Decision-making details documented in ED Course.  Radiology: ordered and independent interpretation performed.  ECG/medicine tests: ordered and independent interpretation  performed.    Risk  OTC drugs.  Prescription drug management.  Parenteral controlled substances.  Decision regarding hospitalization.        CLINICAL IMPRESSION:  1. Congestive heart failure, unspecified HF chronicity, unspecified heart failure type    2. Dyspnea    3. Elevated troponin I measurement        DISPOSITION:   ED Disposition Condition    Observation Stable                    Michael Mclaughlin,   10/23/23 0201       Michael Mclaughlin,   10/23/23 0228

## 2023-10-23 NOTE — PLAN OF CARE
10/23/23 1401   Discharge Assessment   Assessment Type Discharge Planning Assessment   Source of Information patient   People in Home spouse   Do you expect to return to your current living situation? Yes   Do you have help at home or someone to help you manage your care at home? Yes   Prior to hospitilization cognitive status: Alert/Oriented;No Deficits   Current cognitive status: Alert/Oriented;No Deficits   Equipment Currently Used at Home none   Do you currently have service(s) that help you manage your care at home? No   Do you take prescription medications? Yes   Do you have prescription coverage? Yes   Do you have any problems affording any of your prescribed medications? No   Is the patient taking medications as prescribed? yes   How do you get to doctors appointments? family or friend will provide;car, drives self   Are you on dialysis? No   Do you take coumadin? No   DME Needed Upon Discharge  none   Discharge Plan discussed with: Patient;Spouse/sig other   Transition of Care Barriers None   Discharge Plan A Home   Discharge Plan B Home   Physical Activity   On average, how many days per week do you engage in moderate to strenuous exercise (like a brisk walk)? Patient refu   On average, how many minutes do you engage in exercise at this level? Patient refu   Financial Resource Strain   How hard is it for you to pay for the very basics like food, housing, medical care, and heating? Patient refu   Housing Stability   In the last 12 months, was there a time when you were not able to pay the mortgage or rent on time? [place]   In the last 12 months, was there a time when you did not have a steady place to sleep or slept in a shelter (including now)? [place]   Transportation Needs   In the past 12 months, has lack of transportation kept you from medical appointments or from getting medications? Patient refu   In the past 12 months, has lack of transportation kept you from meetings, work, or from getting things needed  for daily living? Patient refu   Food Insecurity   Within the past 12 months, you worried that your food would run out before you got the money to buy more. Patient refu   Within the past 12 months, the food you bought just didn't last and you didn't have money to get more. Patient refu   Stress   Do you feel stress - tense, restless, nervous, or anxious, or unable to sleep at night because your mind is troubled all the time - these days? Patient refu   Social Connections   In a typical week, how many times do you talk on the phone with family, friends, or neighbors? Patient refu   How often do you get together with friends or relatives? Patient refu   How often do you attend Orthodox or Restorationist services? Patient refu   Do you belong to any clubs or organizations such as Orthodox groups, unions, fraternal or athletic groups, or school groups? Patient refu   How often do you attend meetings of the clubs or organizations you belong to? Patient refu   Are you , , , , never , or living with a partner? Patient refu   Alcohol Use   Q1: How often do you have a drink containing alcohol? Patient refu   Q2: How many drinks containing alcohol do you have on a typical day when you are drinking? Patient refu   Q3: How often do you have six or more drinks on one occasion? Patient refu

## 2023-10-23 NOTE — PLAN OF CARE
Problem: Adult Inpatient Plan of Care  Goal: Plan of Care Review  Outcome: Ongoing, Progressing  Goal: Patient-Specific Goal (Individualized)  Outcome: Ongoing, Progressing  Goal: Absence of Hospital-Acquired Illness or Injury  Outcome: Ongoing, Progressing  Goal: Optimal Comfort and Wellbeing  Outcome: Ongoing, Progressing  Goal: Readiness for Transition of Care  Outcome: Ongoing, Progressing     Problem: Respiratory Compromise (Heart Failure)  Goal: Effective Oxygenation and Ventilation  Outcome: Ongoing, Progressing

## 2023-10-23 NOTE — CONSULTS
[facility]  Cardiology  Consult Note    Patient Name: Huang Ramsey  MRN: [id number]  Admission Date: 10/22/2023  Hospital Length of Stay: 0 days  Code Status: Full Code   Attending Provider: Pradeep Do MD   Consulting Provider: CLAIRE Dumont  Primary Care Physician: Pradeep Do MD  Principal Problem:<principal problem not specified>    Patient information was obtained from patient and ER records.     Inpatient consult to Cardiology  Consult performed by: Cassius Marroquin FNP  Consult ordered by: Pradeep Do MD  Reason for consult: elevated trop        Subjective:     Chief Complaint:  SOB     HPI: Patient is a 52 yo male unknown to [facility]. He has a PMH of HTN. He presented to the ED with complaints of SOB that has worsened over the past 2 months. He states that he noticed his whenever he exercises if he has to lay down for an exercise like bench pressing he will get short of breath and at night he gets short of breath when he lays down to sleep. He also reports that over the past couple of days he felt very anxious and can not stop thinking that something is wrong with his heart.  He had some outpatient labs done a month ago which showed a mildly elevated troponin. He has seen Dr Castro and is supposed to have a nuclear stress test. Denies having any chest pain or hemoptysis or lower extremity swelling.    Past Medical History:   Diagnosis Date    Hypertension        History reviewed. No pertinent surgical history.    Review of patient's allergies indicates:   Allergen Reactions    Penicillins        No current facility-administered medications on file prior to encounter.     Current Outpatient Medications on File Prior to Encounter   Medication Sig    amLODIPine (NORVASC) 5 MG tablet Take 5 mg by mouth.    anastrozole (ARIMIDEX) 1 mg Tab Take 1 mg by mouth.    aspirin (ECOTRIN) 81 MG EC tablet Take 81 mg by mouth.    busPIRone  (BUSPAR) 30 MG Tab Take 30 mg by mouth 2 (two) times daily.    diazePAM (VALIUM) 5 MG tablet Take 5 mg by mouth daily as needed.    hydroCHLOROthiazide (HYDRODIURIL) 25 MG tablet Take 25 mg by mouth.    potassium chloride SA (K-DUR,KLOR-CON M) 10 MEQ tablet Take 10 mEq by mouth.    sertraline (ZOLOFT) 100 MG tablet      Family History    None       Tobacco Use    Smoking status: Not on file    Smokeless tobacco: Not on file   Substance and Sexual Activity    Alcohol use: Not on file    Drug use: Not on file    Sexual activity: Not on file     Review of Systems   Constitutional: Negative.   Cardiovascular: Negative.    Respiratory:  Positive for shortness of breath.      Objective:     Vital Signs (Most Recent):  Temp: 97.8 °F (36.6 °C) (10/23/23 0759)  Pulse: (!) 21 (10/23/23 0759)  Resp: (!) 22 (10/23/23 0504)  BP: 120/87 (10/23/23 0759)  SpO2: 97 % (10/23/23 0759) Vital Signs (24h Range):  Temp:  [97.8 °F (36.6 °C)-98.6 °F (37 °C)] 97.8 °F (36.6 °C)  Pulse:  [] 21  Resp:  [18-24] 22  SpO2:  [96 %-98 %] 97 %  BP: (111-140)/(86-89) 120/87     Weight: 99.6 kg (219 lb 9.3 oz)  Body mass index is 31.51 kg/m².    SpO2: 97 %         Intake/Output Summary (Last 24 hours) at 10/23/2023 1120  Last data filed at 10/23/2023 0205  Gross per 24 hour   Intake --   Output 525 ml   Net -525 ml       Lines/Drains/Airways       Peripheral Intravenous Line  Duration                  Peripheral IV - Single Lumen 10/22/23 2349 18 G Anterior;Right Forearm <1 day                    Physical Exam  Constitutional:       General: He is not in acute distress.  Eyes:      Extraocular Movements: Extraocular movements intact.   Cardiovascular:      Rate and Rhythm: Regular rhythm. Tachycardia present.      Heart sounds: No murmur heard.  Pulmonary:      Effort: No respiratory distress.   Musculoskeletal:         General: No swelling. Normal range of motion.   Skin:     General: Skin is warm and dry.   Neurological:      Mental Status: He  is alert and oriented to person, place, and time.   Psychiatric:         Mood and Affect: Mood normal.         Behavior: Behavior normal.         Significant Labs: CMP   Recent Labs   Lab 10/22/23  2339      K 4.1   CO2 22   BUN 32.0*   CREATININE 1.54*   CALCIUM 9.2   , CBC   Recent Labs   Lab 10/22/23  2339   WBC 8.67   HGB 17.6   HCT 53.0*      , and Troponin   Recent Labs   Lab 10/22/23  2339 10/23/23  0353   TROPONINI 0.168* 0.162*       Significant Imaging:   Assessment and Plan:     Shortness of Breath  Elevated BNP  Diuresed well with IV lasix x 1  Elevated troponin, mild  Tend has remained mild and flat  Patient denies any CP  He has outpatient testing coming up w/ Dr Castro  HTN  Stable  Continue CCB      Thank you for your consult.     Cassius Marroquin, NICOLP  Cardiology   [facility] - Medical Surgical Unit

## 2023-10-24 PROBLEM — R79.89 ELEVATED TROPONIN I MEASUREMENT: Status: ACTIVE | Noted: 2023-10-24

## 2023-10-24 PROBLEM — I50.9 CONGESTIVE HEART FAILURE: Status: ACTIVE | Noted: 2023-10-24

## 2023-10-24 PROBLEM — R06.00 DYSPNEA: Status: ACTIVE | Noted: 2023-10-24

## 2023-10-24 LAB
ALBUMIN SERPL-MCNC: 3.9 G/DL (ref 3.5–5)
ALBUMIN/GLOB SERPL: 1.3 RATIO (ref 1.1–2)
ALP SERPL-CCNC: 57 UNIT/L (ref 40–150)
ALT SERPL-CCNC: 86 UNIT/L (ref 0–55)
AMPHET UR QL SCN: NEGATIVE
APTT PPP: 26 SECONDS (ref 24.6–37.2)
AST SERPL-CCNC: 46 UNIT/L (ref 5–34)
BARBITURATE SCN PRESENT UR: NEGATIVE
BASOPHILS # BLD AUTO: 0.05 X10(3)/MCL
BASOPHILS # BLD AUTO: 0.07 X10(3)/MCL
BASOPHILS NFR BLD AUTO: 0.4 %
BASOPHILS NFR BLD AUTO: 0.7 %
BENZODIAZ UR QL SCN: POSITIVE
BILIRUB SERPL-MCNC: 0.9 MG/DL
BNP BLD-MCNC: 689.7 PG/ML
BUN SERPL-MCNC: 26 MG/DL (ref 8.4–25.7)
CALCIUM SERPL-MCNC: 10.6 MG/DL (ref 8.4–10.2)
CANNABINOIDS UR QL SCN: POSITIVE
CHLORIDE SERPL-SCNC: 104 MMOL/L (ref 98–107)
CK SERPL-CCNC: 404 U/L (ref 30–200)
CO2 SERPL-SCNC: 29 MMOL/L (ref 22–29)
COCAINE UR QL SCN: NEGATIVE
CREAT SERPL-MCNC: 1.69 MG/DL (ref 0.73–1.18)
CRP SERPL-MCNC: 1.7 MG/L
CRP SERPL-MCNC: 2.7 MG/L
EOSINOPHIL # BLD AUTO: 0.03 X10(3)/MCL (ref 0–0.9)
EOSINOPHIL # BLD AUTO: 0.32 X10(3)/MCL (ref 0–0.9)
EOSINOPHIL NFR BLD AUTO: 0.2 %
EOSINOPHIL NFR BLD AUTO: 3.1 %
ERYTHROCYTE [DISTWIDTH] IN BLOOD BY AUTOMATED COUNT: 14 % (ref 11.5–17)
ERYTHROCYTE [DISTWIDTH] IN BLOOD BY AUTOMATED COUNT: 14.2 % (ref 11.5–17)
ERYTHROCYTE [SEDIMENTATION RATE] IN BLOOD: 4 MM/HR (ref 0–15)
FENTANYL UR QL SCN: NEGATIVE
GFR SERPLBLD CREATININE-BSD FMLA CKD-EPI: 49 MLS/MIN/1.73/M2
GLOBULIN SER-MCNC: 3 GM/DL (ref 2.4–3.5)
GLUCOSE SERPL-MCNC: 154 MG/DL (ref 74–100)
HCT VFR BLD AUTO: 54.3 % (ref 42–52)
HCT VFR BLD AUTO: 54.4 % (ref 42–52)
HGB BLD-MCNC: 17.9 G/DL (ref 14–18)
HGB BLD-MCNC: 18 G/DL (ref 14–18)
IMM GRANULOCYTES # BLD AUTO: 0.07 X10(3)/MCL (ref 0–0.04)
IMM GRANULOCYTES # BLD AUTO: 0.08 X10(3)/MCL (ref 0–0.04)
IMM GRANULOCYTES NFR BLD AUTO: 0.6 %
IMM GRANULOCYTES NFR BLD AUTO: 0.7 %
INR PPP: 1.1
LYMPHOCYTES # BLD AUTO: 1.39 X10(3)/MCL (ref 0.6–4.6)
LYMPHOCYTES # BLD AUTO: 1.46 X10(3)/MCL (ref 0.6–4.6)
LYMPHOCYTES NFR BLD AUTO: 11 %
LYMPHOCYTES NFR BLD AUTO: 14.1 %
MAGNESIUM SERPL-MCNC: 1.9 MG/DL (ref 1.6–2.6)
MCH RBC QN AUTO: 31.6 PG (ref 27–31)
MCH RBC QN AUTO: 31.8 PG (ref 27–31)
MCHC RBC AUTO-ENTMCNC: 32.9 G/DL (ref 33–36)
MCHC RBC AUTO-ENTMCNC: 33.1 G/DL (ref 33–36)
MCV RBC AUTO: 95.9 FL (ref 80–94)
MCV RBC AUTO: 95.9 FL (ref 80–94)
MDMA UR QL SCN: NEGATIVE
MONOCYTES # BLD AUTO: 0.9 X10(3)/MCL (ref 0.1–1.3)
MONOCYTES # BLD AUTO: 1.03 X10(3)/MCL (ref 0.1–1.3)
MONOCYTES NFR BLD AUTO: 8.1 %
MONOCYTES NFR BLD AUTO: 8.7 %
NEUTROPHILS # BLD AUTO: 10.1 X10(3)/MCL (ref 2.1–9.2)
NEUTROPHILS # BLD AUTO: 7.56 X10(3)/MCL (ref 2.1–9.2)
NEUTROPHILS NFR BLD AUTO: 72.7 %
NEUTROPHILS NFR BLD AUTO: 79.7 %
OPIATES UR QL SCN: NEGATIVE
PCP UR QL: NEGATIVE
PH UR: 6 [PH] (ref 3–11)
PHOSPHATE SERPL-MCNC: 4.7 MG/DL (ref 2.3–4.7)
PLATELET # BLD AUTO: 242 X10(3)/MCL (ref 130–400)
PLATELET # BLD AUTO: 248 X10(3)/MCL (ref 130–400)
PMV BLD AUTO: 10.6 FL (ref 7.4–10.4)
PMV BLD AUTO: 10.9 FL (ref 7.4–10.4)
POTASSIUM SERPL-SCNC: 4.3 MMOL/L (ref 3.5–5.1)
PROT SERPL-MCNC: 6.9 GM/DL (ref 6.4–8.3)
PROTHROMBIN TIME: 14.4 SECONDS (ref 12.5–14.5)
RBC # BLD AUTO: 5.66 X10(6)/MCL (ref 4.7–6.1)
RBC # BLD AUTO: 5.67 X10(6)/MCL (ref 4.7–6.1)
SODIUM SERPL-SCNC: 143 MMOL/L (ref 136–145)
T4 FREE SERPL-MCNC: 0.73 NG/DL (ref 0.7–1.48)
TROPONIN I SERPL-MCNC: 0.12 NG/ML (ref 0–0.04)
TROPONIN I SERPL-MCNC: 0.14 NG/ML (ref 0–0.04)
TSH SERPL-ACNC: 4.73 UIU/ML (ref 0.35–4.94)
WBC # SPEC AUTO: 10.38 X10(3)/MCL (ref 4.5–11.5)
WBC # SPEC AUTO: 12.68 X10(3)/MCL (ref 4.5–11.5)

## 2023-10-24 PROCEDURE — 85610 PROTHROMBIN TIME: CPT | Performed by: FAMILY MEDICINE

## 2023-10-24 PROCEDURE — 25000003 PHARM REV CODE 250: Performed by: FAMILY MEDICINE

## 2023-10-24 PROCEDURE — 83735 ASSAY OF MAGNESIUM: CPT | Performed by: FAMILY MEDICINE

## 2023-10-24 PROCEDURE — 93005 ELECTROCARDIOGRAM TRACING: CPT

## 2023-10-24 PROCEDURE — 63600175 PHARM REV CODE 636 W HCPCS: Performed by: FAMILY MEDICINE

## 2023-10-24 PROCEDURE — 84439 ASSAY OF FREE THYROXINE: CPT | Performed by: FAMILY MEDICINE

## 2023-10-24 PROCEDURE — 25000242 PHARM REV CODE 250 ALT 637 W/ HCPCS: Performed by: FAMILY MEDICINE

## 2023-10-24 PROCEDURE — 93010 EKG 12-LEAD: ICD-10-PCS | Mod: ,,, | Performed by: INTERNAL MEDICINE

## 2023-10-24 PROCEDURE — 94640 AIRWAY INHALATION TREATMENT: CPT

## 2023-10-24 PROCEDURE — 86140 C-REACTIVE PROTEIN: CPT | Performed by: FAMILY MEDICINE

## 2023-10-24 PROCEDURE — 82550 ASSAY OF CK (CPK): CPT | Performed by: FAMILY MEDICINE

## 2023-10-24 PROCEDURE — C9113 INJ PANTOPRAZOLE SODIUM, VIA: HCPCS | Performed by: FAMILY MEDICINE

## 2023-10-24 PROCEDURE — 99900035 HC TECH TIME PER 15 MIN (STAT)

## 2023-10-24 PROCEDURE — 80053 COMPREHEN METABOLIC PANEL: CPT | Performed by: FAMILY MEDICINE

## 2023-10-24 PROCEDURE — 20000000 HC ICU ROOM

## 2023-10-24 PROCEDURE — 84484 ASSAY OF TROPONIN QUANT: CPT | Performed by: FAMILY MEDICINE

## 2023-10-24 PROCEDURE — 93010 ELECTROCARDIOGRAM REPORT: CPT | Mod: ,,, | Performed by: INTERNAL MEDICINE

## 2023-10-24 PROCEDURE — 27000221 HC OXYGEN, UP TO 24 HOURS

## 2023-10-24 PROCEDURE — 85025 COMPLETE CBC W/AUTO DIFF WBC: CPT | Performed by: FAMILY MEDICINE

## 2023-10-24 PROCEDURE — 83880 ASSAY OF NATRIURETIC PEPTIDE: CPT | Performed by: FAMILY MEDICINE

## 2023-10-24 PROCEDURE — 99900031 HC PATIENT EDUCATION (STAT)

## 2023-10-24 PROCEDURE — 84100 ASSAY OF PHOSPHORUS: CPT | Performed by: FAMILY MEDICINE

## 2023-10-24 PROCEDURE — 80307 DRUG TEST PRSMV CHEM ANLYZR: CPT | Performed by: FAMILY MEDICINE

## 2023-10-24 PROCEDURE — 84443 ASSAY THYROID STIM HORMONE: CPT | Performed by: FAMILY MEDICINE

## 2023-10-24 PROCEDURE — 85730 THROMBOPLASTIN TIME PARTIAL: CPT | Performed by: FAMILY MEDICINE

## 2023-10-24 PROCEDURE — 94761 N-INVAS EAR/PLS OXIMETRY MLT: CPT

## 2023-10-24 PROCEDURE — 85652 RBC SED RATE AUTOMATED: CPT | Performed by: FAMILY MEDICINE

## 2023-10-24 RX ORDER — SODIUM CHLORIDE 9 MG/ML
INJECTION, SOLUTION INTRAVENOUS CONTINUOUS
Status: DISCONTINUED | OUTPATIENT
Start: 2023-10-24 | End: 2023-10-24

## 2023-10-24 RX ORDER — HEPARIN SODIUM,PORCINE/D5W 25000/250
0-40 INTRAVENOUS SOLUTION INTRAVENOUS CONTINUOUS
Status: DISCONTINUED | OUTPATIENT
Start: 2023-10-24 | End: 2023-10-28 | Stop reason: HOSPADM

## 2023-10-24 RX ORDER — SERTRALINE HYDROCHLORIDE 50 MG/1
300 TABLET, FILM COATED ORAL DAILY
Status: DISCONTINUED | OUTPATIENT
Start: 2023-10-25 | End: 2023-10-25

## 2023-10-24 RX ORDER — HALOPERIDOL 5 MG/ML
5 INJECTION INTRAMUSCULAR ONCE
Status: COMPLETED | OUTPATIENT
Start: 2023-10-24 | End: 2023-10-24

## 2023-10-24 RX ORDER — DIAZEPAM 5 MG/1
10 TABLET ORAL
Status: DISCONTINUED | OUTPATIENT
Start: 2023-10-24 | End: 2023-10-25

## 2023-10-24 RX ORDER — METOPROLOL TARTRATE 50 MG/1
50 TABLET ORAL 2 TIMES DAILY
Status: DISCONTINUED | OUTPATIENT
Start: 2023-10-24 | End: 2023-10-25

## 2023-10-24 RX ORDER — LORAZEPAM 1 MG/1
1 TABLET ORAL ONCE
Status: COMPLETED | OUTPATIENT
Start: 2023-10-24 | End: 2023-10-24

## 2023-10-24 RX ORDER — DIPHENHYDRAMINE HYDROCHLORIDE 50 MG/ML
50 INJECTION INTRAMUSCULAR; INTRAVENOUS ONCE
Status: COMPLETED | OUTPATIENT
Start: 2023-10-24 | End: 2023-10-24

## 2023-10-24 RX ORDER — MUPIROCIN 20 MG/G
OINTMENT TOPICAL 2 TIMES DAILY
Status: DISCONTINUED | OUTPATIENT
Start: 2023-10-24 | End: 2023-10-28 | Stop reason: HOSPADM

## 2023-10-24 RX ORDER — MORPHINE SULFATE 4 MG/ML
2 INJECTION, SOLUTION INTRAMUSCULAR; INTRAVENOUS EVERY 4 HOURS PRN
Status: DISCONTINUED | OUTPATIENT
Start: 2023-10-24 | End: 2023-10-28 | Stop reason: HOSPADM

## 2023-10-24 RX ORDER — METOPROLOL TARTRATE 50 MG/1
50 TABLET ORAL 2 TIMES DAILY
Status: DISCONTINUED | OUTPATIENT
Start: 2023-10-24 | End: 2023-10-24

## 2023-10-24 RX ORDER — ASPIRIN 325 MG
325 TABLET ORAL DAILY
Status: DISCONTINUED | OUTPATIENT
Start: 2023-10-25 | End: 2023-10-24

## 2023-10-24 RX ORDER — DIPHENHYDRAMINE HCL 25 MG
50 CAPSULE ORAL NIGHTLY PRN
Status: DISCONTINUED | OUTPATIENT
Start: 2023-10-24 | End: 2023-10-24

## 2023-10-24 RX ORDER — LORAZEPAM 1 MG/1
1 TABLET ORAL ONCE
Status: DISCONTINUED | OUTPATIENT
Start: 2023-10-24 | End: 2023-10-24

## 2023-10-24 RX ORDER — ASPIRIN 325 MG
325 TABLET ORAL DAILY
Status: DISCONTINUED | OUTPATIENT
Start: 2023-10-24 | End: 2023-10-28 | Stop reason: HOSPADM

## 2023-10-24 RX ORDER — DIAZEPAM 5 MG/1
5 TABLET ORAL
Status: DISCONTINUED | OUTPATIENT
Start: 2023-10-24 | End: 2023-10-24

## 2023-10-24 RX ORDER — TRAZODONE HYDROCHLORIDE 50 MG/1
150 TABLET ORAL NIGHTLY
Status: DISCONTINUED | OUTPATIENT
Start: 2023-10-24 | End: 2023-10-28 | Stop reason: HOSPADM

## 2023-10-24 RX ORDER — NITROGLYCERIN 0.4 MG/1
0.4 TABLET SUBLINGUAL EVERY 5 MIN PRN
Status: DISCONTINUED | OUTPATIENT
Start: 2023-10-24 | End: 2023-10-28 | Stop reason: HOSPADM

## 2023-10-24 RX ORDER — LORAZEPAM 2 MG/ML
1 INJECTION INTRAMUSCULAR ONCE
Status: COMPLETED | OUTPATIENT
Start: 2023-10-24 | End: 2023-10-24

## 2023-10-24 RX ADMIN — SODIUM CHLORIDE: 9 INJECTION, SOLUTION INTRAVENOUS at 12:10

## 2023-10-24 RX ADMIN — METOPROLOL TARTRATE 50 MG: 50 TABLET, FILM COATED ORAL at 11:10

## 2023-10-24 RX ADMIN — DIAZEPAM 5 MG: 5 TABLET ORAL at 01:10

## 2023-10-24 RX ADMIN — HALOPERIDOL LACTATE 5 MG: 5 INJECTION, SOLUTION INTRAMUSCULAR at 06:10

## 2023-10-24 RX ADMIN — DIPHENHYDRAMINE HYDROCHLORIDE 50 MG: 50 INJECTION INTRAMUSCULAR; INTRAVENOUS at 08:10

## 2023-10-24 RX ADMIN — RACEPINEPHRINE HYDROCHLORIDE 0.5 ML: 11.25 SOLUTION RESPIRATORY (INHALATION) at 09:10

## 2023-10-24 RX ADMIN — SACUBITRIL AND VALSARTAN 1 TABLET: 24; 26 TABLET, FILM COATED ORAL at 09:10

## 2023-10-24 RX ADMIN — MORPHINE SULFATE 2 MG: 4 INJECTION, SOLUTION INTRAMUSCULAR; INTRAVENOUS at 08:10

## 2023-10-24 RX ADMIN — DIAZEPAM 5 MG: 5 TABLET ORAL at 02:10

## 2023-10-24 RX ADMIN — ASPIRIN 325 MG ORAL TABLET 325 MG: 325 PILL ORAL at 10:10

## 2023-10-24 RX ADMIN — SERTRALINE HYDROCHLORIDE 100 MG: 50 TABLET ORAL at 11:10

## 2023-10-24 RX ADMIN — DIAZEPAM 10 MG: 5 TABLET ORAL at 03:10

## 2023-10-24 RX ADMIN — MUPIROCIN 1 G: 20 OINTMENT TOPICAL at 11:10

## 2023-10-24 RX ADMIN — HEPARIN SODIUM 12 UNITS/KG/HR: 10000 INJECTION, SOLUTION INTRAVENOUS at 10:10

## 2023-10-24 RX ADMIN — LORAZEPAM 1 MG: 1 TABLET ORAL at 12:10

## 2023-10-24 RX ADMIN — ANASTROZOLE 1 MG: 1 TABLET ORAL at 11:10

## 2023-10-24 RX ADMIN — LORAZEPAM 1 MG: 2 INJECTION INTRAMUSCULAR; INTRAVENOUS at 09:10

## 2023-10-24 RX ADMIN — PANTOPRAZOLE SODIUM 40 MG: 40 INJECTION, POWDER, FOR SOLUTION INTRAVENOUS at 11:10

## 2023-10-24 RX ADMIN — METOPROLOL TARTRATE 50 MG: 50 TABLET, FILM COATED ORAL at 09:10

## 2023-10-24 RX ADMIN — AMLODIPINE BESYLATE 5 MG: 5 TABLET ORAL at 11:10

## 2023-10-24 RX ADMIN — POTASSIUM CHLORIDE 10 MEQ: 750 TABLET, FILM COATED, EXTENDED RELEASE ORAL at 11:10

## 2023-10-24 NOTE — UM SECONDARY REVIEW
53 year old male admitted with shortness of breath.  On admission, elevated troponin.  Found to have elevated BNP.  Diuresed with Lasix.  Cardiology consulted who recommended outpatient follow-up.  On hospital day 3., the patient continues with persistent tachycardia.  It was also noted that the patient had persistent hypoxia and tachypnea necessitating supplemental oxygenation.  He remains on 2 L. it should also be noted that the patient had worsening acute kidney injury with a creatinine of 1.69.  The patient did also have some CPK elevation.  The patient is alternating between IV fluids and diuretics.  In the setting of persistent tachycardia, persistent need for supplemental oxygenation, worsening acute kidney injury, would favor inpatient level of care.         Lana More MD  Utilization Management  Physician Advisor  [facility]  10/24/2023

## 2023-10-24 NOTE — PROGRESS NOTES
[facility]  Medical Surgical Unit  Cardiology  Progress Note    Patient Name: Huang Ramsey  MRN: [id number]  Admission Date: 10/22/2023  Hospital Length of Stay: 0 days  Code Status: Full Code   Attending Provider: Pradeep Do MD   Consulting Provider: CLAIRE Dumont  Primary Care Physician: Pradeep Do MD  Principal Problem:<principal problem not specified>    Patient information was obtained from patient and ER records.       Subjective:     Chief Complaint:  SOB     HPI: Patient is a 54 yo male unknown to [facility]. He has a PMH of HTN. He presented to the ED with complaints of SOB that has worsened over the past 2 months. He states that he noticed his whenever he exercises if he has to lay down for an exercise like bench pressing he will get short of breath and at night he gets short of breath when he lays down to sleep. He also reports that over the past couple of days he felt very anxious and can not stop thinking that something is wrong with his heart.  He had some outpatient labs done a month ago which showed a mildly elevated troponin. He has seen Dr Castro and is supposed to have a nuclear stress test. Denies having any chest pain or hemoptysis or lower extremity swelling.    10/24/23: Echocardiogram from Dr Castro was reviewed and shows an EF of ~40%. Patient is on room air and O2 Sats are fine. He is having some anxiety due to hospitalization.     Past Medical History:   Diagnosis Date    Hypertension        History reviewed. No pertinent surgical history.    Review of patient's allergies indicates:   Allergen Reactions    Penicillins        No current facility-administered medications on file prior to encounter.     Current Outpatient Medications on File Prior to Encounter   Medication Sig    amLODIPine (NORVASC) 5 MG tablet Take 5 mg by mouth.    anastrozole (ARIMIDEX) 1 mg Tab Take 1 mg by mouth.    aspirin (ECOTRIN) 81 MG EC tablet Take 81 mg by mouth.     busPIRone (BUSPAR) 30 MG Tab Take 30 mg by mouth 2 (two) times daily.    diazePAM (VALIUM) 5 MG tablet Take 5 mg by mouth daily as needed.    hydroCHLOROthiazide (HYDRODIURIL) 25 MG tablet Take 25 mg by mouth.    potassium chloride SA (K-DUR,KLOR-CON M) 10 MEQ tablet Take 10 mEq by mouth.    sertraline (ZOLOFT) 100 MG tablet      Family History    None       Tobacco Use    Smoking status: Not on file    Smokeless tobacco: Not on file   Substance and Sexual Activity    Alcohol use: Not on file    Drug use: Not on file    Sexual activity: Not on file     Review of Systems   Constitutional: Negative.   Cardiovascular: Negative.    Psychiatric/Behavioral:  The patient is nervous/anxious.      Objective:     Vital Signs (Most Recent):  Temp: 97.5 °F (36.4 °C) (10/24/23 1230)  Pulse: 107 (10/24/23 1230)  Resp: 20 (10/24/23 0757)  BP: 117/89 (10/24/23 1230)  SpO2: 100 % (10/24/23 1230) Vital Signs (24h Range):  Temp:  [96.4 °F (35.8 °C)-98.2 °F (36.8 °C)] 97.5 °F (36.4 °C)  Pulse:  [] 107  Resp:  [20] 20  SpO2:  [91 %-100 %] 100 %  BP: (108-132)/(80-91) 117/89     Weight: 99.6 kg (219 lb 9.3 oz)  Body mass index is 31.51 kg/m².    SpO2: 100 %         Intake/Output Summary (Last 24 hours) at 10/24/2023 1546  Last data filed at 10/24/2023 1200  Gross per 24 hour   Intake 920 ml   Output --   Net 920 ml         Lines/Drains/Airways       Peripheral Intravenous Line  Duration                  Peripheral IV - Single Lumen 10/22/23 2349 18 G Anterior;Right Forearm 1 day                    Physical Exam  Constitutional:       General: He is not in acute distress.  Eyes:      Extraocular Movements: Extraocular movements intact.   Cardiovascular:      Rate and Rhythm: Regular rhythm. Tachycardia present.      Heart sounds: No murmur heard.  Pulmonary:      Effort: No respiratory distress.   Musculoskeletal:         General: No swelling. Normal range of motion.   Skin:     General: Skin is warm and dry.   Neurological:       Mental Status: He is alert and oriented to person, place, and time.   Psychiatric:         Mood and Affect: Mood normal.         Behavior: Behavior normal.         Significant Labs: CMP   Recent Labs   Lab 10/22/23  2339 10/24/23  0233    143   K 4.1 4.3   CO2 22 29   BUN 32.0* 26.0*   CREATININE 1.54* 1.69*   CALCIUM 9.2 10.6*   ALBUMIN  --  3.9   BILITOT  --  0.9   ALKPHOS  --  57   AST  --  46*   ALT  --  86*     , CBC   Recent Labs   Lab 10/22/23  2339 10/24/23  0233   WBC 8.67 10.38   HGB 17.6 18.0   HCT 53.0* 54.3*    248     , and Troponin   Recent Labs   Lab 10/22/23  2339 10/23/23  0353 10/24/23  0232   TROPONINI 0.168* 0.162* 0.140*         Significant Imaging:   Assessment and Plan:     Acute systolic CHF  Continue BB and ARNI  Follow up with Dr Castro as outpatient for further titration of meds  Elevated troponin, mild  Trend has remained mild and flat  Patient denies any CP  He has outpatient testing coming up w/ Dr Castro  HTN  Stable  Continue CCB  SALOMÓN  Receiving IVF      Thank you for your consult.   Please call with any further questions    Cassius Marroquin, CLAIRE  Cardiology   [facility] - Medical Surgical Unit

## 2023-10-24 NOTE — NURSING
Patient having intermittent increase in shortness of breath. Contacted Dr. Do who ordered Valium 10 mg q 2 hrs as needed. Proceeded to patient's room to give dose of Valium. While in the room, the patient began to have increased respirations and shortness of breath which lasted approximately 5 minutes. He was able to calm but it occurred again within another 5 minutes. Patient's facial color changes to a purple-rosa color during these episodes. Dr. Do has also increased his Metoprolol to 50 mg. Will continue to monitor patient throughout my shift.

## 2023-10-24 NOTE — PROGRESS NOTES
[facility]                     [address] [address]    PATIENT NAME:       SACHIN DOYLE  YOB: 1970  CSN:                [id number]   MRN:                [id number]  ADMIT DATE:         10/22/2023 23:30:00  PHYSICIAN:          Pradeep Do MD                            PROGRESS NOTE    DATE:      SUBJECTIVE:  He had a poor night last night.  He did not sleep very well.  We   had given some Valium.  He was pacing the halls.  His heart rate has remained   elevated for the most part in the low 100s.  He has had some shortness of   breath, but this has not correlated with any heart rates greater than 110.    PHYSICAL EXAMINATION:  VITAL SIGNS:  He is afebrile.  Pulse is between 97 and 107.  Blood pressure is   stable.  Pulse ox is 100% on room air.  HEART:  Currently is slightly tachycardic with a regular rate and rhythm, 1/6   systolic ejection murmur.  LUNGS:  Clear to auscultation bilaterally.  ABDOMEN:  Nontender, nondistended.  Normoactive bowel sounds.  EXTREMITIES:  No significant edema.    His labs, x-rays, medications are on the chart and reviewed, and I also   discussed with Dr. Mendieta and Dr. Castro.  His polycythemia has worsened   overnight.  His hematocrit is now 54.3.  He remains on an aspirin a day.  His   kidney functions have worsened overnight.  His creatinine is now 1.69 with a BUN   of 26.  Glucose is elevated.  LFTs are elevated with AST and ALT of 46 and 86   respectively.  His BNP increased from 445.2 to 689.7.  His CPK decreased from   500 to 404.  His troponin decreased from 0.168 to 0.140.  His TSH looks okay and   urinalysis looks okay from yesterday.  We received his echocardiogram back and   it does show a decreased ejection fraction.    ASSESSMENT:    1. Cardiomyopathy with increased heart rate and increased shortness breath.  2. Mitral valve insufficiency.  3.  Worsening of his kidney functions.  4. Worsening of his BNP without hypoxia.  5. Anxiety.  6. Insomnia.  7. Elevated LFTs.  8. Elevated D-dimer, PE ruled out.  9. Underlying coronary artery disease with elevated troponin - improving.  10. Multiple other medical problems as per problem list.  11. Increased glucose.  We will get an A1c in the morning.    PLAN:  Continue current care.  We are starting Entresto today, titrating his   beta-blocker, and giving him some fluids.  Increase Zoloft to 300, give Valium   p.r.n., and start trazodone at night.        ______________________________  Pradeep Do MD    PBS/AQS  DD:  10/24/2023  Time:  01:12PM  DT:  10/24/2023  Time:  02:08PM  Job #:  [id number]      PROGRESS NOTE

## 2023-10-24 NOTE — H&P
[facility]                     [address] [address]    PATIENT NAME:       SACHIN DOYLE  YOB: 1970  CSN:                [id number]   MRN:                [id number]  ADMIT DATE:         10/22/2023 23:30:00  PHYSICIAN:          Pradeep Do MD                        HISTORY AND PHYSICAL      REASON FOR VISIT:  He was admitted after presenting to the emergency room with   increased shortness of breath.    HISTORY OF PRESENT ILLNESS:  Mr. Encinas is a 53-year-old male patient, who had   increased shortness of breath and palpitations.  He was seen in the emergency   room and his troponin was initially 0.16.  He had an elevated BNP as well.  He   was admitted for workup.    PAST MEDICAL HISTORY:  Significant for polycythemia, hyperprolactinemia,   hypogonadism on testosterone, vitamin D deficiency, hyperlipidemia, history of   alcohol abuse, depression, anxiety, insomnia, obstructive sleep apnea,   hypertension, cholecystitis, chronic low back pain, chronic renal insufficiency   stage 2, BPH with urinary frequency.    FAMILY HISTORY:  He does have family history of heart disease.    ALLERGIES:  PENICILLINS, METOPROLOL SUCCINATE AND FLOMAX CAUSES DIARRHEA.     MEDICATIONS:  Currently, includes amlodipine 5 mg 1 p.o. daily, aspirin 81 mg 1   p.o. daily, BuSpar 30 mg 1 twice a day, Flomax was discontinued secondary to   diarrhea, he uses Bactroban as needed, testosterone 100 mg/cc 0.67 mL once a   week IM, vitamin D 5,000 units daily, sertraline 150 mg daily, diazepam 5 mg   once a day p.r.n. anxiety, anastrozole 1 mg 1 p.o. daily, albuterol inhaler 1   puff q.4 hours p.r.n. wheeze, and potassium chloride 10 mEq 1 p.o. daily.    IMMUNIZATIONS:  Up-to-date for flu and pneumonia.  His COVID injection was one   in 2021.    PAST SURGICAL HISTORY:  Appendectomy, biceps tear surgery, rotator cuff surgery,    surgery for left shoulder ATS-biceps tendinosis, septoplasty, tonsillectomy,   turbinectomy, liposuction of the flank area, and uvuloplasty.    FAMILY MEDICAL HISTORY:  Mother is alive with multiple medical problems.  Father   is  with myocardial infarction.    SOCIAL HISTORY:  He never smoked.  He does drink alcohol and at times abuse.  No   drugs.    CODE STATUS:  Full.    PHYSICAL EXAMINATION:  VITAL SIGNS:  The patient's vitals on the chart.  Last night, he had elevated   heart rate and telemetry was started.  He has been in the low 100s for heart   rate.  Sinus tachycardia.  He is afebrile.    GENERAL:  He is anxious.  No acute distress.  HEART:  Regular rate and rhythm with a 2/6 systolic ejection murmur.  LUNGS:  Clear to auscultation bilaterally.    ABDOMEN:  Soft and nontender.  EXTREMITIES:  No significant edema.  Oropharynx clear.  NECK:  Supple, full range of motion.  No significant adenopathy.  EXTREMITIES:  No significant edema.    ASSESSMENT:    1. Sinus tachycardia.  2. Increased shortness of breath.  No chest pain, but he does have positive   troponin.  3. Elevated BNP with some interstitial edema seen on the CT.  4. Elevated D-dimer, PE ruled out.  5. Anxiety.  6. History of alcohol abuse.  7. Insomnia.  8. Elevated LFTs.  9. Multiple other medical problems as per problem list.  10. Polycythemia.    PLAN:  Continue current care.  Consult Cardiology.  Continue to monitor his   troponins.  Get his echocardiogram recently done per Dr. Castro.  Follow up   tomorrow.  Recheck a.m. labs.  Start beta-blocker.        ______________________________  Pradeep Do MD    PBS/AQS  DD:  10/24/2023  Time:  10:59AM  DT:  10/24/2023  Time:  11:38AM  Job #:  [id number]      HISTORY AND PHYSICAL

## 2023-10-25 LAB
ALBUMIN SERPL-MCNC: 3.7 G/DL (ref 3.5–5)
ALBUMIN/GLOB SERPL: 1.5 RATIO (ref 1.1–2)
ALLENS TEST: ABNORMAL
ALP SERPL-CCNC: 53 UNIT/L (ref 40–150)
ALT SERPL-CCNC: 129 UNIT/L (ref 0–55)
APTT PPP: 43 SECONDS (ref 24.6–37.2)
APTT PPP: 54.1 SECONDS (ref 24.6–37.2)
APTT PPP: 70 SECONDS (ref 24.6–37.2)
AST SERPL-CCNC: 85 UNIT/L (ref 5–34)
BASOPHILS # BLD AUTO: 0.04 X10(3)/MCL
BASOPHILS NFR BLD AUTO: 0.4 %
BILIRUB SERPL-MCNC: 1.5 MG/DL
BNP BLD-MCNC: 1469.5 PG/ML
BUN SERPL-MCNC: 37 MG/DL (ref 8.4–25.7)
CALCIUM SERPL-MCNC: 9.1 MG/DL (ref 8.4–10.2)
CHLORIDE SERPL-SCNC: 107 MMOL/L (ref 98–107)
CO2 SERPL-SCNC: 21 MMOL/L (ref 22–29)
CREAT SERPL-MCNC: 1.75 MG/DL (ref 0.73–1.18)
DELSYS: ABNORMAL
EOSINOPHIL # BLD AUTO: 0.04 X10(3)/MCL (ref 0–0.9)
EOSINOPHIL NFR BLD AUTO: 0.4 %
ERYTHROCYTE [DISTWIDTH] IN BLOOD BY AUTOMATED COUNT: 14.2 % (ref 11.5–17)
EST. AVERAGE GLUCOSE BLD GHB EST-MCNC: 99.7 MG/DL
GFR SERPLBLD CREATININE-BSD FMLA CKD-EPI: 47 MLS/MIN/1.73/M2
GLOBULIN SER-MCNC: 2.4 GM/DL (ref 2.4–3.5)
GLUCOSE SERPL-MCNC: 108 MG/DL (ref 74–100)
HBA1C MFR BLD: 5.1 %
HCO3 UR-SCNC: 16.1 MMOL/L (ref 24–28)
HCT VFR BLD AUTO: 50.3 % (ref 42–52)
HGB BLD-MCNC: 16.7 G/DL (ref 14–18)
IMM GRANULOCYTES # BLD AUTO: 0.06 X10(3)/MCL (ref 0–0.04)
IMM GRANULOCYTES NFR BLD AUTO: 0.6 %
LYMPHOCYTES # BLD AUTO: 1.18 X10(3)/MCL (ref 0.6–4.6)
LYMPHOCYTES NFR BLD AUTO: 11.3 %
MAGNESIUM SERPL-MCNC: 1.7 MG/DL (ref 1.6–2.6)
MCH RBC QN AUTO: 31.7 PG (ref 27–31)
MCHC RBC AUTO-ENTMCNC: 33.2 G/DL (ref 33–36)
MCV RBC AUTO: 95.6 FL (ref 80–94)
MONOCYTES # BLD AUTO: 1.02 X10(3)/MCL (ref 0.1–1.3)
MONOCYTES NFR BLD AUTO: 9.8 %
NEUTROPHILS # BLD AUTO: 8.11 X10(3)/MCL (ref 2.1–9.2)
NEUTROPHILS NFR BLD AUTO: 77.5 %
PCO2 BLDA: 22.1 MMHG (ref 35–45)
PH SMN: 7.47 [PH] (ref 7.35–7.45)
PHOSPHATE SERPL-MCNC: 3.7 MG/DL (ref 2.3–4.7)
PLATELET # BLD AUTO: 216 X10(3)/MCL (ref 130–400)
PMV BLD AUTO: 11.6 FL (ref 7.4–10.4)
PO2 BLDA: 63 MMHG (ref 80–100)
POC BE: -8 MMOL/L
POC SATURATED O2: 94 % (ref 95–100)
POC TCO2: 17 MMOL/L (ref 23–27)
POTASSIUM SERPL-SCNC: 4.4 MMOL/L (ref 3.5–5.1)
PROT SERPL-MCNC: 6.1 GM/DL (ref 6.4–8.3)
RBC # BLD AUTO: 5.26 X10(6)/MCL (ref 4.7–6.1)
SAMPLE: ABNORMAL
SITE: ABNORMAL
SODIUM SERPL-SCNC: 143 MMOL/L (ref 136–145)
TROPONIN I SERPL-MCNC: 0.1 NG/ML (ref 0–0.04)
WBC # SPEC AUTO: 10.45 X10(3)/MCL (ref 4.5–11.5)

## 2023-10-25 PROCEDURE — 85730 THROMBOPLASTIN TIME PARTIAL: CPT | Performed by: FAMILY MEDICINE

## 2023-10-25 PROCEDURE — 25000003 PHARM REV CODE 250: Performed by: FAMILY MEDICINE

## 2023-10-25 PROCEDURE — 25000242 PHARM REV CODE 250 ALT 637 W/ HCPCS: Performed by: FAMILY MEDICINE

## 2023-10-25 PROCEDURE — 25000003 PHARM REV CODE 250: Performed by: NURSE PRACTITIONER

## 2023-10-25 PROCEDURE — 36600 WITHDRAWAL OF ARTERIAL BLOOD: CPT

## 2023-10-25 PROCEDURE — 85025 COMPLETE CBC W/AUTO DIFF WBC: CPT | Performed by: FAMILY MEDICINE

## 2023-10-25 PROCEDURE — 84484 ASSAY OF TROPONIN QUANT: CPT | Performed by: FAMILY MEDICINE

## 2023-10-25 PROCEDURE — 63600175 PHARM REV CODE 636 W HCPCS: Performed by: FAMILY MEDICINE

## 2023-10-25 PROCEDURE — C9113 INJ PANTOPRAZOLE SODIUM, VIA: HCPCS | Performed by: FAMILY MEDICINE

## 2023-10-25 PROCEDURE — 27000221 HC OXYGEN, UP TO 24 HOURS

## 2023-10-25 PROCEDURE — 93010 EKG 12-LEAD: ICD-10-PCS | Mod: ,,, | Performed by: INTERNAL MEDICINE

## 2023-10-25 PROCEDURE — 83880 ASSAY OF NATRIURETIC PEPTIDE: CPT | Performed by: FAMILY MEDICINE

## 2023-10-25 PROCEDURE — 80053 COMPREHEN METABOLIC PANEL: CPT | Performed by: FAMILY MEDICINE

## 2023-10-25 PROCEDURE — 94640 AIRWAY INHALATION TREATMENT: CPT

## 2023-10-25 PROCEDURE — 82803 BLOOD GASES ANY COMBINATION: CPT

## 2023-10-25 PROCEDURE — 99900035 HC TECH TIME PER 15 MIN (STAT)

## 2023-10-25 PROCEDURE — 93005 ELECTROCARDIOGRAM TRACING: CPT

## 2023-10-25 PROCEDURE — 83735 ASSAY OF MAGNESIUM: CPT | Performed by: FAMILY MEDICINE

## 2023-10-25 PROCEDURE — 20000000 HC ICU ROOM

## 2023-10-25 PROCEDURE — 83036 HEMOGLOBIN GLYCOSYLATED A1C: CPT | Performed by: FAMILY MEDICINE

## 2023-10-25 PROCEDURE — 94761 N-INVAS EAR/PLS OXIMETRY MLT: CPT

## 2023-10-25 PROCEDURE — 93010 ELECTROCARDIOGRAM REPORT: CPT | Mod: ,,, | Performed by: INTERNAL MEDICINE

## 2023-10-25 PROCEDURE — 84100 ASSAY OF PHOSPHORUS: CPT | Performed by: FAMILY MEDICINE

## 2023-10-25 RX ORDER — METOPROLOL TARTRATE 25 MG/1
25 TABLET, FILM COATED ORAL 2 TIMES DAILY
Status: DISCONTINUED | OUTPATIENT
Start: 2023-10-25 | End: 2023-10-28 | Stop reason: HOSPADM

## 2023-10-25 RX ORDER — CHLORDIAZEPOXIDE HYDROCHLORIDE 5 MG/1
5 CAPSULE, GELATIN COATED ORAL 4 TIMES DAILY PRN
Status: DISCONTINUED | OUTPATIENT
Start: 2023-10-25 | End: 2023-10-28 | Stop reason: HOSPADM

## 2023-10-25 RX ORDER — SERTRALINE HYDROCHLORIDE 50 MG/1
200 TABLET, FILM COATED ORAL DAILY
Status: DISCONTINUED | OUTPATIENT
Start: 2023-10-26 | End: 2023-10-28 | Stop reason: HOSPADM

## 2023-10-25 RX ORDER — IPRATROPIUM BROMIDE AND ALBUTEROL SULFATE 2.5; .5 MG/3ML; MG/3ML
3 SOLUTION RESPIRATORY (INHALATION) ONCE
Status: COMPLETED | OUTPATIENT
Start: 2023-10-25 | End: 2023-10-25

## 2023-10-25 RX ORDER — LORAZEPAM 2 MG/ML
1 INJECTION INTRAMUSCULAR ONCE
Status: COMPLETED | OUTPATIENT
Start: 2023-10-25 | End: 2023-10-25

## 2023-10-25 RX ORDER — DOBUTAMINE HYDROCHLORIDE 400 MG/100ML
0-20 INJECTION INTRAVENOUS CONTINUOUS
Status: DISCONTINUED | OUTPATIENT
Start: 2023-10-25 | End: 2023-10-28 | Stop reason: HOSPADM

## 2023-10-25 RX ORDER — LORAZEPAM 2 MG/ML
2 INJECTION INTRAMUSCULAR
Status: COMPLETED | OUTPATIENT
Start: 2023-10-25 | End: 2023-10-26

## 2023-10-25 RX ORDER — QUETIAPINE FUMARATE 25 MG/1
25 TABLET, FILM COATED ORAL 2 TIMES DAILY
Status: DISCONTINUED | OUTPATIENT
Start: 2023-10-25 | End: 2023-10-28 | Stop reason: HOSPADM

## 2023-10-25 RX ORDER — FUROSEMIDE 10 MG/ML
20 INJECTION INTRAMUSCULAR; INTRAVENOUS ONCE
Status: COMPLETED | OUTPATIENT
Start: 2023-10-25 | End: 2023-10-25

## 2023-10-25 RX ORDER — HALOPERIDOL 5 MG/ML
5 INJECTION INTRAMUSCULAR EVERY 6 HOURS PRN
Status: DISCONTINUED | OUTPATIENT
Start: 2023-10-25 | End: 2023-10-28 | Stop reason: HOSPADM

## 2023-10-25 RX ORDER — OXCARBAZEPINE 150 MG/1
150 TABLET, FILM COATED ORAL 2 TIMES DAILY
Status: DISCONTINUED | OUTPATIENT
Start: 2023-10-25 | End: 2023-10-28 | Stop reason: HOSPADM

## 2023-10-25 RX ORDER — HYDROXYZINE HYDROCHLORIDE 25 MG/1
50 TABLET, FILM COATED ORAL 3 TIMES DAILY PRN
Status: DISCONTINUED | OUTPATIENT
Start: 2023-10-25 | End: 2023-10-28 | Stop reason: HOSPADM

## 2023-10-25 RX ADMIN — CHLORDIAZEPOXIDE HYDROCHLORIDE 5 MG: 5 CAPSULE ORAL at 12:10

## 2023-10-25 RX ADMIN — MUPIROCIN 1 G: 20 OINTMENT TOPICAL at 09:10

## 2023-10-25 RX ADMIN — LORAZEPAM 2 MG: 2 INJECTION INTRAMUSCULAR; INTRAVENOUS at 03:10

## 2023-10-25 RX ADMIN — METOPROLOL TARTRATE 50 MG: 50 TABLET, FILM COATED ORAL at 09:10

## 2023-10-25 RX ADMIN — PANTOPRAZOLE SODIUM 40 MG: 40 INJECTION, POWDER, FOR SOLUTION INTRAVENOUS at 09:10

## 2023-10-25 RX ADMIN — POTASSIUM CHLORIDE 10 MEQ: 750 TABLET, FILM COATED, EXTENDED RELEASE ORAL at 09:10

## 2023-10-25 RX ADMIN — HEPARIN SODIUM 15 UNITS/KG/HR: 10000 INJECTION, SOLUTION INTRAVENOUS at 09:10

## 2023-10-25 RX ADMIN — QUETIAPINE FUMARATE 25 MG: 25 TABLET ORAL at 08:10

## 2023-10-25 RX ADMIN — DOBUTAMINE IN DEXTROSE 2 MCG/KG/MIN: 400 INJECTION, SOLUTION INTRAVENOUS at 12:10

## 2023-10-25 RX ADMIN — LORAZEPAM 2 MG: 2 INJECTION INTRAMUSCULAR; INTRAVENOUS at 09:10

## 2023-10-25 RX ADMIN — LORAZEPAM 1 MG: 2 INJECTION INTRAMUSCULAR; INTRAVENOUS at 12:10

## 2023-10-25 RX ADMIN — OXCARBAZEPINE 150 MG: 150 TABLET, FILM COATED ORAL at 08:10

## 2023-10-25 RX ADMIN — ASPIRIN 325 MG ORAL TABLET 325 MG: 325 PILL ORAL at 09:10

## 2023-10-25 RX ADMIN — FUROSEMIDE 20 MG: 10 INJECTION, SOLUTION INTRAMUSCULAR; INTRAVENOUS at 06:10

## 2023-10-25 RX ADMIN — SODIUM CHLORIDE 250 ML: 9 INJECTION, SOLUTION INTRAVENOUS at 02:10

## 2023-10-25 RX ADMIN — ANASTROZOLE 1 MG: 1 TABLET ORAL at 09:10

## 2023-10-25 RX ADMIN — MUPIROCIN: 20 OINTMENT TOPICAL at 08:10

## 2023-10-25 RX ADMIN — IPRATROPIUM BROMIDE AND ALBUTEROL SULFATE 3 ML: 2.5; .5 SOLUTION RESPIRATORY (INHALATION) at 01:10

## 2023-10-25 RX ADMIN — TRAZODONE HYDROCHLORIDE 150 MG: 50 TABLET ORAL at 08:10

## 2023-10-25 RX ADMIN — CHLORDIAZEPOXIDE HYDROCHLORIDE 5 MG: 5 CAPSULE ORAL at 09:10

## 2023-10-25 RX ADMIN — LORAZEPAM 2 MG: 2 INJECTION INTRAMUSCULAR; INTRAVENOUS at 11:10

## 2023-10-25 RX ADMIN — METOPROLOL TARTRATE 25 MG: 25 TABLET, FILM COATED ORAL at 08:10

## 2023-10-25 RX ADMIN — SERTRALINE HYDROCHLORIDE 300 MG: 50 TABLET ORAL at 09:10

## 2023-10-25 NOTE — NURSING
0200-now pt asleep without distress. HR- 83-94 ST. Resp 34-38bpm, o2 sat 93-96% no additional doses of ativan given since 0034.

## 2023-10-25 NOTE — NURSING
Acute episodes continue to occur frequently. Dr Peralta notified of this. Ativan 2mg ivp q2h prn x 4 doses ordered.

## 2023-10-25 NOTE — PLAN OF CARE
Problem: Adult Inpatient Plan of Care  Goal: Plan of Care Review  Outcome: Ongoing, Progressing  Goal: Patient-Specific Goal (Individualized)  Outcome: Ongoing, Progressing  Goal: Absence of Hospital-Acquired Illness or Injury  Outcome: Ongoing, Progressing  Goal: Optimal Comfort and Wellbeing  Outcome: Ongoing, Progressing  Goal: Readiness for Transition of Care  Outcome: Ongoing, Progressing     Problem: Respiratory Compromise (Heart Failure)  Goal: Effective Oxygenation and Ventilation  Outcome: Ongoing, Progressing     Problem: Heart Failure Comorbidity  Goal: Maintenance of Heart Failure Symptom Control  Outcome: Ongoing, Progressing     Problem: Hypertension Comorbidity  Goal: Blood Pressure in Desired Range  Outcome: Ongoing, Progressing     Problem: Obstructive Sleep Apnea Risk or Actual Comorbidity Management  Goal: Unobstructed Breathing During Sleep  Outcome: Ongoing, Progressing     Problem: Adjustment to Illness (Heart Failure)  Goal: Optimal Coping  Outcome: Ongoing, Progressing     Problem: Cardiac Output Decreased (Heart Failure)  Goal: Optimal Cardiac Output  Outcome: Ongoing, Progressing     Problem: Dysrhythmia (Heart Failure)  Goal: Stable Heart Rate and Rhythm  Outcome: Ongoing, Progressing     Problem: Fluid Imbalance (Heart Failure)  Goal: Fluid Balance  Outcome: Ongoing, Progressing     Problem: Functional Ability Impaired (Heart Failure)  Goal: Optimal Functional Ability  Outcome: Ongoing, Progressing     Problem: Oral Intake Inadequate (Heart Failure)  Goal: Optimal Nutrition Intake  Outcome: Ongoing, Progressing     Problem: Sleep Disordered Breathing (Heart Failure)  Goal: Effective Breathing Pattern During Sleep  Outcome: Ongoing, Progressing

## 2023-10-25 NOTE — PROGRESS NOTES
"Inpatient Nutrition Evaluation    Admit Date: 10/22/2023   Total duration of encounter: 3 days    Nutrition Recommendation/Prescription     Recommend Heart Healthy, Low Sodium diet as tolerated.   Daily weight and labs.   Monitor intake, weight, and labs.     RD following and available as needed. Thank you.    Nutrition Assessment     Chart Review    Reason Seen: continuous nutrition monitoring    Malnutrition Screening Tool Results   Have you recently lost weight without trying?: No  Have you been eating poorly because of a decreased appetite?: No   MST Score: 0     Diagnosis:  1. Cardiomyopathy with increased heart rate and increased shortness breath.  2. Mitral valve insufficiency.  3. Worsening of his kidney functions.  4. Worsening of his BNP without hypoxia.  5. Anxiety.  6. Insomnia.  7. Elevated LFTs.  8. Elevated D-dimer, PE ruled out.  9. Underlying coronary artery disease with elevated troponin - improving.  10. Multiple other medical problems as per problem list.  11. Increased glucose.  We will get an A1c in the morning.    Relevant Medical History:   HTN.     Nutrition-Related Medications:   Heparin; Protonix.     Nutrition-Related Labs:  10/26:  BUN/Crea 31.0/1.59(H); GFR 52(L); Alb 3.4(L); AST/ALT 62/129(H); .5(H); Troponin 1 0.095(H)    Diet Order: Diet Cardiac  Oral Supplement Order: none  Appetite/Oral Intake: good/50-75% of meals  Factors Affecting Nutritional Intake: none identified  Food/Alevism/Cultural Preferences: none reported  Food Allergies: none reported       Wound(s):       Comments    10/26: Good appetite and intake reported. No recent weight loss noted/reported. Labs and meds reviewed. Will continue to monitor during stay.     Anthropometrics    Height: 5' 10" (177.8 cm) Height Method: Stated  Last Weight: 97 kg (213 lb 13.5 oz) (10/25/23 0600) Weight Method: Bed Scale  BMI (Calculated): 30.7  BMI Classification: obese grade I (BMI 30-34.9)        Ideal Body Weight (IBW), " Male: 166 lb     % Ideal Body Weight, Male (lb): 132.28 %                          Usual Weight Provided By: unable to obtain usual weight    Wt Readings from Last 5 Encounters:   10/25/23 97 kg (213 lb 13.5 oz)   11/08/21 98.9 kg (218 lb 0.6 oz)     Weight Change(s) Since Admission:  Admit Weight: 99.6 kg (219 lb 9.3 oz) (10/22/23 2326)      Patient Education    Not applicable.    Monitoring & Evaluation     Dietitian will monitor food and beverage intake, energy intake, weight, weight change, electrolyte/renal panel, glucose/endocrine profile, and gastrointestinal profile.  Nutrition Risk/Follow-Up: low (follow-up in 5-7 days)  Patients assigned 'low nutrition risk' status do not qualify for a full nutritional assessment but will be monitored and re-evaluated in a 5-7 day time period. Please consult if re-evaluation needed sooner.

## 2023-10-25 NOTE — NURSING
Telecardiology consult in progress at bedside. Pt leaning over bedside table tachypneic at this time.

## 2023-10-25 NOTE — PROGRESS NOTES
I was called around 9:00 p.m. regarding the patient.  He was transferred to the ICU secondary to a mild elevation of his already elevated troponin, worsening shortness of breath, and anxiety.  He has a known history of coronary artery disease with cardiomyopathy with reduced EF at 30%, followed outpatient by Dr. Sanchez Castro.      Nurse reported to me that the 8:00 p.m. EKG showed new inferior changes on his EKG in addition to the anterior changes that were present on admission, he has a known decreased EF at 30%.  Cardiology had consulted earlier in the afternoon today, and recommended continuing trending his cardiac enzymes and outpatient follow-up with Dr. Castro.  Review of his medication show that his last dose of aspirin was yesterday.  Today he has been given several doses of anxiolytics with no improvement in his symptoms.    Tele cardiology has been consulted.  They are currently assessing him and recommended transfer for concern of ACS given lack of improvement with anxiolytics, persistent anginal equivalent symptoms, and new changes on EKG.     I am ordering ASA 325mg chewed, continue O2, and heparin drip. House supervisor is here assisting with transfer initiation.     I spoke with his wife, Asia, who is aware that it can take hours to get this transfer facilitated.

## 2023-10-25 NOTE — NURSING
Received to ICU bed 4 from med surg.AAOX3. C/O SOB and epigastric pain. Connected to bedside monitor showing HR-. 02 in place at 2L NC with a sat of 83%, presently having an acute episode of SOB o2 increased to 4L NC. Encouraged to breath in through nose. Lower face and neck as well as R thigh turns puple in color during episode. Acute SOB passed after a minute. Face, neck and R thigh returns to original color.
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
## 2023-10-25 NOTE — NURSING
0340-now-- resting without distress. VSS. Awakens with a brief episode of audible wheezing and SOB. Breathing coached, distress subsided.

## 2023-10-25 NOTE — NURSING
Awakens with acute SOB episode, audible wheezing, resp up to 47 bpm desatting to 79% breathing coached, episode passes within a few minutes. Ativan 2mg IVP given at this time.

## 2023-10-25 NOTE — NURSING
"Awakens from a deep sleep sits up on side of bed leaning over bed side table. Acutely SOB, audible wheezing, diaphoretic. O2 in place, saturations down to 85%. Again face, neck and R thigh turn purple in color. States " I cant breath, I cant go on like this, I feel like I'm having a heart attack" Coached to breath in through nose and within a minute or two acute episode passes and color returns to normal.  "

## 2023-10-25 NOTE — NURSING
Labs received and reviewed. Heparin titrated per protocol. TNL=7643.5, reported to Dr Peralta. Orders noted.

## 2023-10-25 NOTE — NURSING
Again patient arouses from a deep sleep, sits up on side of bed audible wheezing, diaphoretic acutely SOB. Passes within a few minutes and pt lays back down.

## 2023-10-25 NOTE — CONSULTS
Reason for the consult: epigastric pain  HPI:  The patient was admitted to [facility] for evaluation of epigastric pain.   Patient is followed by Dr. Sanchez Castro.  Onset:  few days ago  Location:  epigastric pain   Intensity:  moderate-severe  Aggravating factors:  None  Alleviating factors:  None  Associated symptoms:  fatigue    Troponin in the ER: mildly elevated, peak troponin is 0.168.  CTA chest negative for PE but showed coronary calcifications affecting the LAD and LCX.  EKG showed Sinus tachycardia with RBBB.  The Echo showed severely reduced LVEF 30%.  Social history: Denies tobacco use, denies excessive Alcohol use, denies illegal drug use. Although UDS was positive for BZD and Cannabis  Surgical history: space  Family history: space  Allergy: NKDA    ROS: As per HPI, the rest of the ROS were reviewed and are negative.        Physical examination:    Patient is alert and oriented x 3, in no acute distress  vital signs: Reviewed by me.  CV: regular rhythm and normal rate. No murmur, rubs, or gallops.  Lungs: Clear to auscultation bilaterally. No wheezing. No crackles.  Extremities No leg edema      Assessment:    Mildly elevated troponin of unclear etiology but suspicious for obstructive CAD  CMO of undetermined etiology yet, LVEF 30%, probably ischemic in nature  Epigastric pain can possibly be related to obstructive CAD.  CAD : coronary calcification noted on CTA chest  Abnormal renal function/SALOMÓN, crea 1.69  RBBB  Sinus tachycardia   UDS positive for BZD and Cannabinoid (patient denies use)    Plan:    Transfer to [facility] for further cardiac evaluation and probably Left Heart Catheterization   Transfer to Telemetry bed  Do serial troponin until peak is reached.   Continue Beta-blockers, recommend switching to Metoprolol succinate prior to discharge.   Hold Entresto due to SALOMÓN, may need to switch to low dose losartan when renal function improves.  Avoid nephrotoxic drugs due to abnormal  renal indices.     IV heparin per cardiac protocol  Continue Aspirin 81 mg orally once daily.    Start Atorvastatin 40 mg orally once daily at bedtime.   Will need Left Heart Catheterization (watch closely renal function)  Risks, indications, benefits, and alternatives were discussed in detail with the patient who agreed to proceed. Keep the patient NPO after midnight.   No recent GIB  No Allergy to iodine contrast      Thank you for letting me participate in the care of your patient.

## 2023-10-25 NOTE — NURSING
"Patient with increased anxiety over night. Received melatonin @ 2039. Shortly after, patient complained of feeling full in epigastric area like "his food wasn't settling." Dr. Hardwick notified of patient complaint and gave order for protonix 40 mg IV daily with dose to be given then. When in room giving protonix, patient complained of a panic attack. Patient denied having chest pain but complained of SOB when lying down and sitting in chair. When in bed or chair, patient would wear NC @ 2L prn per his comfort. Saturation checked and was WDL @ 98% on RA. Dr. Hardwick notified and Xanax 0.25 mg x1 given at 2306. Patient experienced no relief from Xanax. Dr. Hardwick notified of continued anxiety from patient. Ativan 1 mg po x1 given at 0030. No relief obtain after receiving Ativan. Attempted to relax patient by switching from bed to recliner in room, offering to walk outside which patient refused, and sitting at nursing desk so patient wouldn't be alone. Patient continued to pace halls and alternate between sitting in room and at nursing desk. Dr. Hardwick again notified of no relief of anxiety. Order given to collect cardiac enzymes and give patient Valium 5 mg po. Patient started to relax after receiving valium at 0230 and fell asleep between [id number].   "

## 2023-10-25 NOTE — NURSING
Acute episodes  of SOB, wheezing, diaphoretic and color changes continues to happen frequently. Spoke with Dr. Peralta and notified of this. Orders noted.

## 2023-10-26 LAB
ALBUMIN SERPL-MCNC: 3.4 G/DL (ref 3.5–5)
ALBUMIN/GLOB SERPL: 1.6 RATIO (ref 1.1–2)
ALP SERPL-CCNC: 48 UNIT/L (ref 40–150)
ALT SERPL-CCNC: 129 UNIT/L (ref 0–55)
APTT PPP: 46.2 SECONDS (ref 24.6–37.2)
APTT PPP: 60.1 SECONDS (ref 24.6–37.2)
APTT PPP: 61.1 SECONDS (ref 24.6–37.2)
APTT PPP: 72.8 SECONDS (ref 24.6–37.2)
AST SERPL-CCNC: 62 UNIT/L (ref 5–34)
BASOPHILS # BLD AUTO: 0.05 X10(3)/MCL
BASOPHILS NFR BLD AUTO: 0.5 %
BILIRUB SERPL-MCNC: 1.4 MG/DL
BNP BLD-MCNC: 464.5 PG/ML
BUN SERPL-MCNC: 31 MG/DL (ref 8.4–25.7)
CALCIUM SERPL-MCNC: 8.8 MG/DL (ref 8.4–10.2)
CHLORIDE SERPL-SCNC: 107 MMOL/L (ref 98–107)
CO2 SERPL-SCNC: 25 MMOL/L (ref 22–29)
CREAT SERPL-MCNC: 1.59 MG/DL (ref 0.73–1.18)
CREAT UR-MCNC: 212 MG/DL (ref 63–166)
CREAT UR-MCNC: 214.3 MG/DL (ref 63–166)
EOSINOPHIL # BLD AUTO: 0.25 X10(3)/MCL (ref 0–0.9)
EOSINOPHIL NFR BLD AUTO: 2.6 %
ERYTHROCYTE [DISTWIDTH] IN BLOOD BY AUTOMATED COUNT: 14 % (ref 11.5–17)
GFR SERPLBLD CREATININE-BSD FMLA CKD-EPI: 52 MLS/MIN/1.73/M2
GLOBULIN SER-MCNC: 2.1 GM/DL (ref 2.4–3.5)
GLUCOSE SERPL-MCNC: 85 MG/DL (ref 74–100)
HCT VFR BLD AUTO: 45.1 % (ref 42–52)
HGB BLD-MCNC: 14.8 G/DL (ref 14–18)
IMM GRANULOCYTES # BLD AUTO: 0.04 X10(3)/MCL (ref 0–0.04)
IMM GRANULOCYTES NFR BLD AUTO: 0.4 %
LYMPHOCYTES # BLD AUTO: 1.49 X10(3)/MCL (ref 0.6–4.6)
LYMPHOCYTES NFR BLD AUTO: 15.3 %
MAGNESIUM SERPL-MCNC: 1.6 MG/DL (ref 1.6–2.6)
MCH RBC QN AUTO: 31.7 PG (ref 27–31)
MCHC RBC AUTO-ENTMCNC: 32.8 G/DL (ref 33–36)
MCV RBC AUTO: 96.6 FL (ref 80–94)
MICROALBUMIN UR-MCNC: 324.5 UG/ML
MICROALBUMIN/CREAT RATIO PNL UR: 153.1 MG/GM CR (ref 0–30)
MONOCYTES # BLD AUTO: 0.82 X10(3)/MCL (ref 0.1–1.3)
MONOCYTES NFR BLD AUTO: 8.4 %
NEUTROPHILS # BLD AUTO: 7.11 X10(3)/MCL (ref 2.1–9.2)
NEUTROPHILS NFR BLD AUTO: 72.8 %
PHOSPHATE SERPL-MCNC: 2.7 MG/DL (ref 2.3–4.7)
PLATELET # BLD AUTO: 186 X10(3)/MCL (ref 130–400)
PMV BLD AUTO: 11.2 FL (ref 7.4–10.4)
POTASSIUM SERPL-SCNC: 3.7 MMOL/L (ref 3.5–5.1)
PROT SERPL-MCNC: 5.5 GM/DL (ref 6.4–8.3)
PROT UR STRIP-MCNC: 60.2 MG/DL
RBC # BLD AUTO: 4.67 X10(6)/MCL (ref 4.7–6.1)
SODIUM SERPL-SCNC: 142 MMOL/L (ref 136–145)
TROPONIN I SERPL-MCNC: 0.1 NG/ML (ref 0–0.04)
URINE PROTEIN/CREATININE RATIO (OHS): 0.3
WBC # SPEC AUTO: 9.76 X10(3)/MCL (ref 4.5–11.5)

## 2023-10-26 PROCEDURE — 82043 UR ALBUMIN QUANTITATIVE: CPT | Performed by: INTERNAL MEDICINE

## 2023-10-26 PROCEDURE — 63600175 PHARM REV CODE 636 W HCPCS: Performed by: FAMILY MEDICINE

## 2023-10-26 PROCEDURE — 25000003 PHARM REV CODE 250: Performed by: FAMILY MEDICINE

## 2023-10-26 PROCEDURE — 80053 COMPREHEN METABOLIC PANEL: CPT | Performed by: FAMILY MEDICINE

## 2023-10-26 PROCEDURE — 20000000 HC ICU ROOM

## 2023-10-26 PROCEDURE — 25000003 PHARM REV CODE 250: Performed by: NURSE PRACTITIONER

## 2023-10-26 PROCEDURE — 84100 ASSAY OF PHOSPHORUS: CPT | Performed by: FAMILY MEDICINE

## 2023-10-26 PROCEDURE — 85025 COMPLETE CBC W/AUTO DIFF WBC: CPT | Performed by: FAMILY MEDICINE

## 2023-10-26 PROCEDURE — 82570 ASSAY OF URINE CREATININE: CPT | Performed by: INTERNAL MEDICINE

## 2023-10-26 PROCEDURE — 27000221 HC OXYGEN, UP TO 24 HOURS

## 2023-10-26 PROCEDURE — 84484 ASSAY OF TROPONIN QUANT: CPT | Performed by: FAMILY MEDICINE

## 2023-10-26 PROCEDURE — C9113 INJ PANTOPRAZOLE SODIUM, VIA: HCPCS | Performed by: FAMILY MEDICINE

## 2023-10-26 PROCEDURE — 83735 ASSAY OF MAGNESIUM: CPT | Performed by: FAMILY MEDICINE

## 2023-10-26 PROCEDURE — 83880 ASSAY OF NATRIURETIC PEPTIDE: CPT | Performed by: FAMILY MEDICINE

## 2023-10-26 PROCEDURE — 94761 N-INVAS EAR/PLS OXIMETRY MLT: CPT

## 2023-10-26 PROCEDURE — 85730 THROMBOPLASTIN TIME PARTIAL: CPT | Performed by: FAMILY MEDICINE

## 2023-10-26 RX ORDER — PANTOPRAZOLE SODIUM 40 MG/1
40 TABLET, DELAYED RELEASE ORAL DAILY
Status: DISCONTINUED | OUTPATIENT
Start: 2023-10-27 | End: 2023-10-28 | Stop reason: HOSPADM

## 2023-10-26 RX ADMIN — HYDROXYZINE HYDROCHLORIDE 50 MG: 25 TABLET, FILM COATED ORAL at 05:10

## 2023-10-26 RX ADMIN — TRAZODONE HYDROCHLORIDE 150 MG: 50 TABLET ORAL at 10:10

## 2023-10-26 RX ADMIN — DOBUTAMINE IN DEXTROSE 15 MCG/KG/MIN: 400 INJECTION, SOLUTION INTRAVENOUS at 01:10

## 2023-10-26 RX ADMIN — QUETIAPINE FUMARATE 25 MG: 25 TABLET ORAL at 08:10

## 2023-10-26 RX ADMIN — MUPIROCIN 1 G: 20 OINTMENT TOPICAL at 10:10

## 2023-10-26 RX ADMIN — ASPIRIN 325 MG ORAL TABLET 325 MG: 325 PILL ORAL at 08:10

## 2023-10-26 RX ADMIN — DOBUTAMINE IN DEXTROSE 20 MCG/KG/MIN: 400 INJECTION, SOLUTION INTRAVENOUS at 02:10

## 2023-10-26 RX ADMIN — QUETIAPINE FUMARATE 25 MG: 25 TABLET ORAL at 10:10

## 2023-10-26 RX ADMIN — HYDROXYZINE HYDROCHLORIDE 50 MG: 25 TABLET, FILM COATED ORAL at 10:10

## 2023-10-26 RX ADMIN — LORAZEPAM 2 MG: 2 INJECTION INTRAMUSCULAR; INTRAVENOUS at 09:10

## 2023-10-26 RX ADMIN — DOBUTAMINE IN DEXTROSE 20 MCG/KG/MIN: 400 INJECTION, SOLUTION INTRAVENOUS at 11:10

## 2023-10-26 RX ADMIN — METOPROLOL TARTRATE 25 MG: 25 TABLET, FILM COATED ORAL at 08:10

## 2023-10-26 RX ADMIN — PANTOPRAZOLE SODIUM 40 MG: 40 INJECTION, POWDER, FOR SOLUTION INTRAVENOUS at 08:10

## 2023-10-26 RX ADMIN — HEPARIN SODIUM 21 UNITS/KG/HR: 10000 INJECTION, SOLUTION INTRAVENOUS at 02:10

## 2023-10-26 RX ADMIN — OXCARBAZEPINE 150 MG: 150 TABLET, FILM COATED ORAL at 10:10

## 2023-10-26 RX ADMIN — OXCARBAZEPINE 150 MG: 150 TABLET, FILM COATED ORAL at 08:10

## 2023-10-26 RX ADMIN — HEPARIN SODIUM 24 UNITS/KG/HR: 10000 INJECTION, SOLUTION INTRAVENOUS at 04:10

## 2023-10-26 RX ADMIN — SERTRALINE HYDROCHLORIDE 200 MG: 50 TABLET ORAL at 08:10

## 2023-10-26 RX ADMIN — CHLORDIAZEPOXIDE HYDROCHLORIDE 5 MG: 5 CAPSULE ORAL at 06:10

## 2023-10-26 RX ADMIN — METOPROLOL TARTRATE 25 MG: 25 TABLET, FILM COATED ORAL at 10:10

## 2023-10-26 RX ADMIN — HALOPERIDOL LACTATE 5 MG: 5 INJECTION, SOLUTION INTRAMUSCULAR at 10:10

## 2023-10-26 RX ADMIN — CHLORDIAZEPOXIDE HYDROCHLORIDE 5 MG: 5 CAPSULE ORAL at 04:10

## 2023-10-26 RX ADMIN — MUPIROCIN 1 G: 20 OINTMENT TOPICAL at 08:10

## 2023-10-26 RX ADMIN — SODIUM CHLORIDE 250 ML: 9 INJECTION, SOLUTION INTRAVENOUS at 02:10

## 2023-10-26 NOTE — PROGRESS NOTES
[facility]                     [address] [address]    PATIENT NAME:       SACHIN DOYLE  YOB: 1970  CSN:                [id number]   MRN:                [id number]  ADMIT DATE:         10/22/2023 23:30:00  PHYSICIAN:          Pradeep Do MD                            PROGRESS NOTE    DATE:  10/26/2023 00:00:00    SUBJECTIVE:  He remains in the ICU secondary to his panic attack.  He may be   DT'ing from his alcohol.  He is very labile with his breathing.  His ABG is on   the chart.  Pulse has been between 112 and 105 mostly.  There is a 224 recorded   and I am not sure if this is correct.  Respiratory rate has been elevated, but   when he is sleeping, he is actually having some periods of apnea.  He remains   with O2.  His pulse ox on O2 nasal cannula 3 L was 97%.    PHYSICAL EXAMINATION:  GENERAL:  Otherwise reveals he is in no acute distress.  He is lying on his side   sleeping.  HEART:  Regular rhythm with a mildly tachycardic rate.  2/6 systolic ejection   murmur.  LUNGS:  Clear to auscultation bilaterally.    ABDOMEN:  Nontender, nondistended, normoactive bowel sounds.  EXTREMITIES:  No significant edema.  His anxiety of course is controlled now   that he is sleeping.    ASSESSMENT:    1. Myocardial ischemia.  His troponins continue to trend downward.  2. Mild rhabdomyolysis with a CK up over 400.  3. Testosterone use-long-term.  4. Alcohol abuse.  5. Severe anxiety.  6. Chronic elevated LFTs.  7. Acute renal insufficiency-improving.  8. Hypotension.  9. Hypoxia related to his hypoventilation.  10. Multiple other medical problems as per problem list.    PLAN:  Continue current care and wean dobutamine when he is stable.  We will   give another 250 mL of normal saline today.  Continue to trend his troponins and   recheck CK in the morning.  We will recheck labs  also.        ______________________________  MD HENRRY Lucas/TIM  DD:  10/26/2023  Time:  01:29PM  DT:  10/26/2023  Time:  03:59PM  Job #:  [id number]      PROGRESS NOTE

## 2023-10-26 NOTE — NURSING
0800 Spoke with ;transfer center at [facility] no beds. Called Michelle with [facility] she said she spoke with Dr Castro and has the patient on the list for transfer awaiting bed.    1547 Spoke with Michelle at [facility] and still no beds at [facility] or [facility] in [place] will continue to try for tomorrow.

## 2023-10-26 NOTE — PROGRESS NOTES
[facility]                     [address] [id number]    PATIENT NAME:       SACHIN DOYLE  YOB: 1970  CSN:                [id number]   MRN:                [id number]  ADMIT DATE:         10/22/2023 23:30:00  PHYSICIAN:          Pradeep Do MD                            PROGRESS NOTE    DATE:  10/25/2023 00:00:00    SUBJECTIVE:  He was moved to the ICU last night secondary to uncontrolled panic   attacks and anxiety.  He does have underlying coronary artery disease and has   had some myocardial ischemia.  His troponins were elevated, but are now trending   downward.  He does have underlying cardiomyopathy.  Other problems have   included acute renal dysfunction and also elevated LFTs.  He does have a history   of alcohol abuse at home and urine drug screen showed marijuana, and of course,   benzodiazepines that have been given to him.  His tachycardia has persisted,   but not over 110 beats per minute.  His chest x-ray showed some flash pulmonary   edema with the IV fluids and they were stopped last night.  Today, they were   restarted for short duration along with his dobutamine.  I am discussing this   with Dr. Castro and Dr. Harrington.  Also, Psychiatry was consulted and   recommendations are on the chart.    PHYSICAL EXAMINATION:  VITAL SIGNS:  The patient's blood pressure is in the upper 90s/60s.  His heart   rate is between 95 and 105.  Pulse ox is good.  He is on oxygen .  He does   not desaturate when he takes his oxygen off.  ABGs on the chart and reviewed.    GENERAL: Otherwise, he is in no acute distress except for his extreme anxiety.    He sits down, hyperventilates and stands up and leans over, sits down, tries to   lay down again.  HEART:  Regular rate and rhythm with a 2/6 systolic ejection murmur.  LUNGS:  Clear to auscultation bilaterally.    ABDOMEN:  Nontender and  nondistended.  EXTREMITIES:  No significant edema.    ASSESSMENT:    1. Myocardial ischemia with elevated troponin.  This is trending down.  2. Elevated LFTs with history of alcohol abuse.  3. Elevated kidney functions with hypotension.  4. Anxiety.  5. Insomnia.  6. Multiple other medical problems as per problem list.    PLAN:  Continue current care and we are trying to prepping for a catheterization   of his heart.  We tried to get his kidney functions better.  We will continue   the dobutamine and Librium since tends to be calming him down and he is able to   sleep some with it.  Precedex is pending, if this fails to work.  We will follow   Psychiatry recommendation and also Nephrology recommendation.        ______________________________  MD HENRRY Lucas/TIM  DD:  10/26/2023  Time:  01:26PM  DT:  10/26/2023  Time:  04:09PM  Job #:  [id number]      PROGRESS NOTE

## 2023-10-26 NOTE — CONSULTS
[facility]  [address] [address]  Phone: [phone]    Department of Nephrology  Consult Note      PATIENT NAME: Huang Ramsey    MRN: [id number]  TODAY'S DATE: 10/26/2023  ADMIT DATE: 10/22/2023                          CONSULT REQUESTED BY: Pradeep Do MD    SUBJECTIVE     PRINCIPAL PROBLEM: Elevated troponin I measurement      REASON FOR CONSULT:  Acute kidney injury on chronic kidney disease.      HPI:  Patient is a 53-year-old  male who has very significant anxiety issues they evaluated as a result of abnormal troponins and admitted to the ICU where he is on a dobutamine drip and has been taken off of his Entresto.  His creatinine essentially has improved back to his baseline which is stage III a ranging from 45 to 59 cc/minute.  He is on no nephrotoxins he recalls no history of uncontrolled hypertension however he is used anabolic steroids for a long period of time and he does have proteinuria so this may be the cause of his CKD.        Review of patient's allergies indicates:   Allergen Reactions    Penicillins        Past Medical History:   Diagnosis Date    Hypertension      History reviewed. No pertinent surgical history.        Review of Systems   Constitutional: Negative.    HENT: Negative.     Respiratory:          Patient is tachypneic states he can not breathe but SaO2 is good   Cardiovascular:  Negative for palpitations and leg swelling.   Gastrointestinal: Negative.    Neurological: Negative.    Psychiatric/Behavioral:  Positive for agitation. Negative for self-injury and suicidal ideas. The patient is nervous/anxious.    All other systems reviewed and are negative.      OBJECTIVE     VITAL SIGNS (Most Recent)  Temp: 97.9 °F (36.6 °C) (10/26/23 0730)  Pulse: 105 (10/26/23 0745)  Resp: (!) 31 (10/26/23 0745)  BP: 106/60 (10/26/23 0730)  SpO2: (!) 93 % (10/26/23 0745)    VENTILATION STATUS  Resp: (!) 31 (10/26/23 0745)  SpO2: (!)  93 % (10/26/23 0745)  Oxygen Concentration (%):  [2-32] 32        I & O (Last 24H):  Intake/Output Summary (Last 24 hours) at 10/26/2023 0945  Last data filed at 10/26/2023 0321  Gross per 24 hour   Intake 1078.72 ml   Output 600 ml   Net 478.72 ml       WEIGHTS  Wt Readings from Last 3 Encounters:   10/26/23 0506 97.2 kg (214 lb 4.6 oz)   10/25/23 0600 97 kg (213 lb 13.5 oz)   10/22/23 2326 99.6 kg (219 lb 9.3 oz)   11/08/21 1428 98.9 kg (218 lb 0.6 oz)   10/25/21 1424 100.2 kg (220 lb 14.4 oz)       Physical Exam  Constitutional:       Appearance: He is not toxic-appearing.      Comments: Patient is very anxious appearing up on the side bed.   HENT:      Head: Normocephalic and atraumatic.      Mouth/Throat:      Mouth: Mucous membranes are moist.   Eyes:      Extraocular Movements: Extraocular movements intact.      Conjunctiva/sclera: Conjunctivae normal.      Pupils: Pupils are equal, round, and reactive to light.   Neck:      Vascular: No carotid bruit.      Comments: No JVD  Cardiovascular:      Rate and Rhythm: Regular rhythm. Tachycardia present.      Heart sounds: No murmur heard.     No gallop.   Pulmonary:      Comments: Patient is tachypneic with very slight rhonchi noted no rales  Abdominal:      Tenderness: There is no abdominal tenderness. There is no guarding or rebound.   Musculoskeletal:      Cervical back: Neck supple. No rigidity.      Right lower leg: No edema.      Left lower leg: No edema.   Skin:     Capillary Refill: Capillary refill takes less than 2 seconds.   Neurological:      General: No focal deficit present.      Cranial Nerves: No cranial nerve deficit.   Psychiatric:         Attention and Perception: Attention normal.         Mood and Affect: Mood is anxious. Affect is blunt.         Speech: Speech is delayed.         Behavior: Behavior is cooperative.         Thought Content: Thought content is paranoid.         HOME MEDICATIONS:  No current facility-administered medications on  file prior to encounter.     Current Outpatient Medications on File Prior to Encounter   Medication Sig Dispense Refill    amLODIPine (NORVASC) 5 MG tablet Take 5 mg by mouth.      anastrozole (ARIMIDEX) 1 mg Tab Take 1 mg by mouth.      aspirin (ECOTRIN) 81 MG EC tablet Take 81 mg by mouth.      busPIRone (BUSPAR) 30 MG Tab Take 30 mg by mouth 2 (two) times daily.      diazePAM (VALIUM) 5 MG tablet Take 5 mg by mouth daily as needed.      hydroCHLOROthiazide (HYDRODIURIL) 25 MG tablet Take 25 mg by mouth.      potassium chloride SA (K-DUR,KLOR-CON M) 10 MEQ tablet Take 10 mEq by mouth.      sertraline (ZOLOFT) 100 MG tablet          SCHEDULED MEDS:   aspirin  325 mg Oral Daily    metoprolol tartrate  25 mg Oral BID    mupirocin   Nasal BID    OXcarbazepine  150 mg Oral BID    pantoprazole  40 mg Intravenous Daily    QUEtiapine  25 mg Oral BID    sertraline  200 mg Oral Daily    traZODone  150 mg Oral QHS       CONTINUOUS INFUSIONS:   DOBUTamine IV infusion (titrating) 15 mcg/kg/min (10/26/23 0321)    heparin (porcine) in D5W 21 Units/kg/hr (10/26/23 0321)       PRN MEDS:chlordiazepoxide, haloperidol lactate, heparin (PORCINE), heparin (PORCINE), hydrOXYzine HCL, melatonin, morphine, nitroGLYCERIN, sodium chloride 0.9%    LABS AND DIAGNOSTICS     CBC LAST 3 DAYS  Recent Labs   Lab 10/24/23  2209 10/25/23  0451 10/26/23  0343   WBC 12.68* 10.45 9.76   RBC 5.67 5.26 4.67*   HGB 17.9 16.7 14.8   HCT 54.4* 50.3 45.1   MCV 95.9* 95.6* 96.6*   MCH 31.6* 31.7* 31.7*   MCHC 32.9* 33.2 32.8*   RDW 14.2 14.2 14.0    216 186   MPV 10.9* 11.6* 11.2*       COAGULATION LAST 3 DAYS  Recent Labs   Lab 10/24/23  2209   INR 1.1       CHEMISTRY LAST 3 DAYS  Recent Labs   Lab 10/24/23  0233 10/25/23  0451 10/25/23  1238 10/26/23  0343    143  --  142   K 4.3 4.4  --  3.7   CO2 29 21*  --  25   BUN 26.0* 37.0*  --  31.0*   CREATININE 1.69* 1.75*  --  1.59*   CALCIUM 10.6* 9.1  --  8.8   PH  --   --  7.472*  --    MG 1.90  1.70  --  1.60   ALBUMIN 3.9 3.7  --  3.4*   ALKPHOS 57 53  --  48   ALT 86* 129*  --  129*   AST 46* 85*  --  62*   BILITOT 0.9 1.5  --  1.4       CARDIAC PROFILE LAST 3 DAYS  Recent Labs   Lab 10/22/23  2339 10/23/23  0353 10/24/23  0233 10/24/23  1949 10/25/23  0451 10/26/23  0343   .2*  --  689.7*  --  1,469.5* 464.5*   *  --  404*  --   --   --    CPKMB 4.9  --   --   --   --   --    TROPONINI 0.168*   < >  --  0.121* 0.102* 0.095*    < > = values in this interval not displayed.       ENDOCRINE LAST 3 DAYS  Recent Labs   Lab 10/24/23  0233   TSH 4.729       LAST ARTERIAL BLOOD GAS  ABG  Recent Labs   Lab 10/25/23  1238   PH 7.472*   PO2 63*   PCO2 22.1*   HCO3 16.1*   BE -8*       LAST 7 DAYS MICROBIOLOGY   Microbiology Results (last 7 days)       ** No results found for the last 168 hours. **            MOST RECENT IMAGING  X-Ray Chest 1 View  Narrative: EXAMINATION:  XR CHEST 1 VIEW    CLINICAL HISTORY:  hypoxia;, .    COMPARISON:  10/24/2023    FINDINGS:  An AP view or more reveals the heart to be mildly enlarged.  The trachea is just right of midline.  Central pulmonary vasculature is prominent.  Calcified granuloma is again evident at the lateral right mid lung.  There is improved aeration at the right lung base since the prior exam.  No infiltrate or effusion is seen.  Impression: 1. Interim improved aeration at the right lung base since the prior exam  2. Mild cardiomegaly  3. Prominent central pulmonary vasculature    Electronically signed by: Reginald Hernandez  Date:    10/25/2023  Time:    17:05  X-Ray Chest 1 View  Narrative: EXAMINATION:  XR CHEST 1 VIEW    CLINICAL HISTORY:  chest pain;, .    COMPARISON:  10/22/2023    FINDINGS:  An AP view or more reveals the heart to be enlarged.  The trachea is midline.  Mild patchy hazy density is evident at the lateral right lower lung.  Central pulmonary vasculature is prominent.  Bony structures appear grossly intact.  A small calcified granulomas  again evident at the lateral right the mid lung.  Impression: 1. Mild cardiomegaly  2. Infiltrate and or atelectasis right lower lung  3. Prominent central pulmonary vasculature    Electronically signed by: Reginald Hernandez  Date:    10/25/2023  Time:    09:58      ECHOCARDIOGRAM RESULTS (last 5)  No results found for this or any previous visit.      CURRENT/PREVIOUS VISIT EKG  Results for orders placed or performed during the hospital encounter of 10/22/23   EKG 12-lead    Collection Time: 10/25/23 12:11 AM    Narrative    Test Reason : I50.9,    Vent. Rate : 102 BPM     Atrial Rate : 102 BPM     P-R Int : 152 ms          QRS Dur : 158 ms      QT Int : 406 ms       P-R-T Axes : 051 -59 036 degrees     QTc Int : 529 ms    Sinus tachycardia  Possible Left atrial enlargement  Left axis deviation  Right bundle branch block  Inferior infarct (cited on or before 24-OCT-2023)  Abnormal ECG  Confirmed by José Antonio Woods MD ([id number]) on 10/26/2023 1:09:24 AM    Referred By: [email or web address]   SELF           Confirmed By:José Antonio Woods MD           ASSESSMENT/PLAN:     Active Hospital Problems    Diagnosis    *Elevated troponin I measurement    Congestive heart failure    Dyspnea       ASSESSMENT & PLAN:   Patient is a 53-year-old  male history of panic attacks in general anxiety disorder possibly PTSD.  Brought in with shortness of breath chest pains and elevated troponin his Entresto was stopped his creatinine is essentially at baseline agree with conservative therapy would prefer using captopril as it has a short half-life if needed to titrate for his blood pressure.  It is believed that his elevated creatinine is import related to his muscle mass and possibly some ongoing nephropathy given his proteinuria probably related to anabolic steroids.  Will assess degree of proteinuria.  Patient's serum pH 7.47 with a pCO2 of 22.1 bicarb of 16.1 PO2 of 63.  Patient has a primary respiratory alkalosis and 1 would expect his bicarb to be  approximately 11 with current pCO2 however it is 16 given him a respiratory and metabolic alkalosis.      RECOMMENDATIONS:  Usual avoidance of nephrotoxins and renally dosing his medications.  If blood pressures become I would arrange recommend captopril b.i.d. to t.i.d. we will hold off for now.        Kurt Harrington MD  Department of Nephrology  Date of Service: 10/26/2023  9:45 AM

## 2023-10-26 NOTE — PROGRESS NOTES
[facility]  Medical Surgical Unit  Cardiology  Progress Note    Patient Name: Huang Ramsey  MRN: [id number]  Admission Date: 10/22/2023  Hospital Length of Stay: 2 days  Code Status: Full Code   Attending Provider: Pradeep Do MD   Consulting Provider: CLAIRE Dumont  Primary Care Physician: Pradeep Do MD  Principal Problem:Elevated troponin I measurement    Patient information was obtained from patient and ER records.       Subjective:     Chief Complaint:  SOB     HPI: Patient is a 52 yo male unknown to [facility]. He has a PMH of HTN. He presented to the ED with complaints of SOB that has worsened over the past 2 months. He states that he noticed his whenever he exercises if he has to lay down for an exercise like bench pressing he will get short of breath and at night he gets short of breath when he lays down to sleep. He also reports that over the past couple of days he felt very anxious and can not stop thinking that something is wrong with his heart.  He had some outpatient labs done a month ago which showed a mildly elevated troponin. He has seen Dr Castro and is supposed to have a nuclear stress test. Denies having any chest pain or hemoptysis or lower extremity swelling.    10/24/23: Echocardiogram from Dr Castro was reviewed and shows an EF of ~40%. Patient is on room air and O2 Sats are fine. He is having some anxiety due to hospitalization.   10/26/23: Patient in ICU in stable condition. He was evaluated by psych yesterday due to anxiety issues. He denies any CP or SOB. He is currently resting w/o distress.     Past Medical History:   Diagnosis Date    Hypertension        History reviewed. No pertinent surgical history.    Review of patient's allergies indicates:   Allergen Reactions    Penicillins        No current facility-administered medications on file prior to encounter.     Current Outpatient Medications on File Prior to Encounter   Medication Sig     amLODIPine (NORVASC) 5 MG tablet Take 5 mg by mouth.    anastrozole (ARIMIDEX) 1 mg Tab Take 1 mg by mouth.    aspirin (ECOTRIN) 81 MG EC tablet Take 81 mg by mouth.    busPIRone (BUSPAR) 30 MG Tab Take 30 mg by mouth 2 (two) times daily.    diazePAM (VALIUM) 5 MG tablet Take 5 mg by mouth daily as needed.    hydroCHLOROthiazide (HYDRODIURIL) 25 MG tablet Take 25 mg by mouth.    potassium chloride SA (K-DUR,KLOR-CON M) 10 MEQ tablet Take 10 mEq by mouth.    sertraline (ZOLOFT) 100 MG tablet      Family History    None       Tobacco Use    Smoking status: Not on file    Smokeless tobacco: Not on file   Substance and Sexual Activity    Alcohol use: Not on file    Drug use: Not on file    Sexual activity: Not on file     Review of Systems   Constitutional: Negative.   Cardiovascular: Negative.    Psychiatric/Behavioral:  The patient is nervous/anxious.      Objective:     Vital Signs (Most Recent):  Temp: 97.9 °F (36.6 °C) (10/26/23 1106)  Pulse: 105 (10/26/23 1106)  Resp: (!) 25 (10/26/23 1106)  BP: 120/82 (10/26/23 1106)  SpO2: 97 % (10/26/23 1106) Vital Signs (24h Range):  Temp:  [96.8 °F (36 °C)-97.9 °F (36.6 °C)] 97.9 °F (36.6 °C)  Pulse:  [] 105  Resp:  [17-38] 25  SpO2:  [86 %-97 %] 97 %  BP: ()/(60-86) 120/82     Weight: 97.2 kg (214 lb 4.6 oz)  Body mass index is 30.75 kg/m².    SpO2: 97 %         Intake/Output Summary (Last 24 hours) at 10/26/2023 1337  Last data filed at 10/26/2023 1001  Gross per 24 hour   Intake 1478.72 ml   Output 1100 ml   Net 378.72 ml         Lines/Drains/Airways       Peripheral Intravenous Line  Duration                  Peripheral IV - Single Lumen 10/26/23 0930 18 G Anterior;Right Forearm <1 day         Peripheral IV - Single Lumen 10/26/23 1030 20 G Right Antecubital <1 day                    Physical Exam  Constitutional:       General: He is not in acute distress.  Eyes:      Extraocular Movements: Extraocular movements intact.   Cardiovascular:      Rate and Rhythm:  Regular rhythm. Tachycardia present.      Heart sounds: No murmur heard.  Pulmonary:      Effort: No respiratory distress.   Musculoskeletal:         General: No swelling. Normal range of motion.   Skin:     General: Skin is warm and dry.   Neurological:      Mental Status: He is alert and oriented to person, place, and time.   Psychiatric:         Mood and Affect: Mood normal.         Behavior: Behavior normal.         Significant Labs: CMP   Recent Labs   Lab 10/25/23  0451 10/26/23  0343    142   K 4.4 3.7   CO2 21* 25   BUN 37.0* 31.0*   CREATININE 1.75* 1.59*   CALCIUM 9.1 8.8   ALBUMIN 3.7 3.4*   BILITOT 1.5 1.4   ALKPHOS 53 48   AST 85* 62*   * 129*     , CBC   Recent Labs   Lab 10/24/23  2209 10/25/23  0451 10/26/23  0343   WBC 12.68* 10.45 9.76   HGB 17.9 16.7 14.8   HCT 54.4* 50.3 45.1    216 186     , and Troponin   Recent Labs   Lab 10/24/23  1949 10/25/23  0451 10/26/23  0343   TROPONINI 0.121* 0.102* 0.095*         Significant Imaging:   Assessment and Plan:     Acute systolic CHF  Continue BB   No ACEi/ARB/ARNI s/t renal function   Currently on dobutamine gtt  Elevated troponin, mild  Trend has remained mild and flat  Patient denies any CP  He is awaiting transfer to [facility]  HTN  Stable  SALOM[facility]N  Nephrology consulted       Thank you for your consult.   Please call with any further questions    Cassius Marroquin, CLAIRE  Cardiology   [facility] - Medical Surgical Unit

## 2023-10-27 LAB
ALBUMIN SERPL-MCNC: 3.3 G/DL (ref 3.5–5)
ALBUMIN/GLOB SERPL: 1.4 RATIO (ref 1.1–2)
ALP SERPL-CCNC: 48 UNIT/L (ref 40–150)
ALT SERPL-CCNC: 133 UNIT/L (ref 0–55)
APTT PPP: 73.5 SECONDS (ref 24.6–37.2)
AST SERPL-CCNC: 54 UNIT/L (ref 5–34)
BASOPHILS # BLD AUTO: 0.03 X10(3)/MCL
BASOPHILS NFR BLD AUTO: 0.4 %
BILIRUB SERPL-MCNC: 1.1 MG/DL
BUN SERPL-MCNC: 19 MG/DL (ref 8.4–25.7)
CALCIUM SERPL-MCNC: 8.8 MG/DL (ref 8.4–10.2)
CHLORIDE SERPL-SCNC: 107 MMOL/L (ref 98–107)
CO2 SERPL-SCNC: 25 MMOL/L (ref 22–29)
CREAT SERPL-MCNC: 1.29 MG/DL (ref 0.73–1.18)
EOSINOPHIL # BLD AUTO: 0.25 X10(3)/MCL (ref 0–0.9)
EOSINOPHIL NFR BLD AUTO: 3 %
ERYTHROCYTE [DISTWIDTH] IN BLOOD BY AUTOMATED COUNT: 14.2 % (ref 11.5–17)
GFR SERPLBLD CREATININE-BSD FMLA CKD-EPI: >60 MLS/MIN/1.73/M2
GLOBULIN SER-MCNC: 2.4 GM/DL (ref 2.4–3.5)
GLUCOSE SERPL-MCNC: 83 MG/DL (ref 74–100)
HCT VFR BLD AUTO: 44.5 % (ref 42–52)
HGB BLD-MCNC: 14.4 G/DL (ref 14–18)
IMM GRANULOCYTES # BLD AUTO: 0.02 X10(3)/MCL (ref 0–0.04)
IMM GRANULOCYTES NFR BLD AUTO: 0.2 %
LYMPHOCYTES # BLD AUTO: 0.98 X10(3)/MCL (ref 0.6–4.6)
LYMPHOCYTES NFR BLD AUTO: 11.6 %
MAGNESIUM SERPL-MCNC: 1.7 MG/DL (ref 1.6–2.6)
MCH RBC QN AUTO: 31.6 PG (ref 27–31)
MCHC RBC AUTO-ENTMCNC: 32.4 G/DL (ref 33–36)
MCV RBC AUTO: 97.6 FL (ref 80–94)
MONOCYTES # BLD AUTO: 0.77 X10(3)/MCL (ref 0.1–1.3)
MONOCYTES NFR BLD AUTO: 9.1 %
NEUTROPHILS # BLD AUTO: 6.41 X10(3)/MCL (ref 2.1–9.2)
NEUTROPHILS NFR BLD AUTO: 75.7 %
PHOSPHATE SERPL-MCNC: 2.9 MG/DL (ref 2.3–4.7)
PLATELET # BLD AUTO: 175 X10(3)/MCL (ref 130–400)
PMV BLD AUTO: 11.6 FL (ref 7.4–10.4)
POTASSIUM SERPL-SCNC: 3.5 MMOL/L (ref 3.5–5.1)
PROT SERPL-MCNC: 5.7 GM/DL (ref 6.4–8.3)
RBC # BLD AUTO: 4.56 X10(6)/MCL (ref 4.7–6.1)
SODIUM SERPL-SCNC: 141 MMOL/L (ref 136–145)
TROPONIN I SERPL-MCNC: 0.08 NG/ML (ref 0–0.04)
WBC # SPEC AUTO: 8.46 X10(3)/MCL (ref 4.5–11.5)

## 2023-10-27 PROCEDURE — 83735 ASSAY OF MAGNESIUM: CPT | Performed by: FAMILY MEDICINE

## 2023-10-27 PROCEDURE — 85025 COMPLETE CBC W/AUTO DIFF WBC: CPT | Performed by: FAMILY MEDICINE

## 2023-10-27 PROCEDURE — 20000000 HC ICU ROOM

## 2023-10-27 PROCEDURE — 80053 COMPREHEN METABOLIC PANEL: CPT | Performed by: FAMILY MEDICINE

## 2023-10-27 PROCEDURE — 85730 THROMBOPLASTIN TIME PARTIAL: CPT | Performed by: FAMILY MEDICINE

## 2023-10-27 PROCEDURE — 63600175 PHARM REV CODE 636 W HCPCS: Performed by: FAMILY MEDICINE

## 2023-10-27 PROCEDURE — 25000003 PHARM REV CODE 250: Performed by: FAMILY MEDICINE

## 2023-10-27 PROCEDURE — 84484 ASSAY OF TROPONIN QUANT: CPT | Performed by: FAMILY MEDICINE

## 2023-10-27 PROCEDURE — C1751 CATH, INF, PER/CENT/MIDLINE: HCPCS

## 2023-10-27 PROCEDURE — 84100 ASSAY OF PHOSPHORUS: CPT | Performed by: FAMILY MEDICINE

## 2023-10-27 PROCEDURE — A4216 STERILE WATER/SALINE, 10 ML: HCPCS | Performed by: FAMILY MEDICINE

## 2023-10-27 PROCEDURE — 94761 N-INVAS EAR/PLS OXIMETRY MLT: CPT

## 2023-10-27 PROCEDURE — 25000003 PHARM REV CODE 250: Performed by: NURSE PRACTITIONER

## 2023-10-27 PROCEDURE — 27000221 HC OXYGEN, UP TO 24 HOURS

## 2023-10-27 PROCEDURE — 36569 INSJ PICC 5 YR+ W/O IMAGING: CPT

## 2023-10-27 RX ORDER — MAGNESIUM SULFATE HEPTAHYDRATE 40 MG/ML
INJECTION, SOLUTION INTRAVENOUS
Status: DISCONTINUED
Start: 2023-10-27 | End: 2023-10-28 | Stop reason: HOSPADM

## 2023-10-27 RX ORDER — SODIUM CHLORIDE 0.9 % (FLUSH) 0.9 %
10 SYRINGE (ML) INJECTION
Status: DISCONTINUED | OUTPATIENT
Start: 2023-10-27 | End: 2023-10-28 | Stop reason: HOSPADM

## 2023-10-27 RX ORDER — MAGNESIUM SULFATE HEPTAHYDRATE 40 MG/ML
4 INJECTION, SOLUTION INTRAVENOUS ONCE
Status: COMPLETED | OUTPATIENT
Start: 2023-10-27 | End: 2023-10-27

## 2023-10-27 RX ORDER — SODIUM CHLORIDE 9 MG/ML
INJECTION, SOLUTION INTRAVENOUS CONTINUOUS
Status: DISCONTINUED | OUTPATIENT
Start: 2023-10-27 | End: 2023-10-28 | Stop reason: HOSPADM

## 2023-10-27 RX ORDER — SODIUM CHLORIDE 0.9 % (FLUSH) 0.9 %
10 SYRINGE (ML) INJECTION EVERY 6 HOURS
Status: DISCONTINUED | OUTPATIENT
Start: 2023-10-27 | End: 2023-10-28 | Stop reason: HOSPADM

## 2023-10-27 RX ADMIN — MUPIROCIN 1 G: 20 OINTMENT TOPICAL at 08:10

## 2023-10-27 RX ADMIN — HYDROXYZINE HYDROCHLORIDE 50 MG: 25 TABLET, FILM COATED ORAL at 09:10

## 2023-10-27 RX ADMIN — Medication 10 ML: at 06:10

## 2023-10-27 RX ADMIN — SODIUM CHLORIDE: 9 INJECTION, SOLUTION INTRAVENOUS at 05:10

## 2023-10-27 RX ADMIN — METOPROLOL TARTRATE 25 MG: 25 TABLET, FILM COATED ORAL at 08:10

## 2023-10-27 RX ADMIN — OXCARBAZEPINE 150 MG: 150 TABLET, FILM COATED ORAL at 08:10

## 2023-10-27 RX ADMIN — HEPARIN SODIUM 26 UNITS/KG/HR: 10000 INJECTION, SOLUTION INTRAVENOUS at 04:10

## 2023-10-27 RX ADMIN — CHLORDIAZEPOXIDE HYDROCHLORIDE 5 MG: 5 CAPSULE ORAL at 09:10

## 2023-10-27 RX ADMIN — PANTOPRAZOLE SODIUM 40 MG: 40 TABLET, DELAYED RELEASE ORAL at 08:10

## 2023-10-27 RX ADMIN — HYDROXYZINE HYDROCHLORIDE 50 MG: 25 TABLET, FILM COATED ORAL at 03:10

## 2023-10-27 RX ADMIN — DOBUTAMINE IN DEXTROSE 20 MCG/KG/MIN: 400 INJECTION, SOLUTION INTRAVENOUS at 08:10

## 2023-10-27 RX ADMIN — POTASSIUM PHOSPHATE, MONOBASIC AND POTASSIUM PHOSPHATE, DIBASIC 20 MMOL: 224; 236 INJECTION, SOLUTION, CONCENTRATE INTRAVENOUS at 05:10

## 2023-10-27 RX ADMIN — MAGNESIUM SULFATE HEPTAHYDRATE 4 G: 40 INJECTION, SOLUTION INTRAVENOUS at 05:10

## 2023-10-27 RX ADMIN — TRAZODONE HYDROCHLORIDE 150 MG: 50 TABLET ORAL at 08:10

## 2023-10-27 RX ADMIN — SERTRALINE HYDROCHLORIDE 200 MG: 50 TABLET ORAL at 08:10

## 2023-10-27 RX ADMIN — CHLORDIAZEPOXIDE HYDROCHLORIDE 5 MG: 5 CAPSULE ORAL at 08:10

## 2023-10-27 RX ADMIN — CHLORDIAZEPOXIDE HYDROCHLORIDE 5 MG: 5 CAPSULE ORAL at 03:10

## 2023-10-27 RX ADMIN — ASPIRIN 325 MG ORAL TABLET 325 MG: 325 PILL ORAL at 08:10

## 2023-10-27 RX ADMIN — QUETIAPINE FUMARATE 25 MG: 25 TABLET ORAL at 08:10

## 2023-10-27 RX ADMIN — DOBUTAMINE IN DEXTROSE 20 MCG/KG/MIN: 400 INJECTION, SOLUTION INTRAVENOUS at 05:10

## 2023-10-27 NOTE — PROCEDURES
"Huang Ramsey is a 53 y.o. male patient.    Temp: 97.5 °F (36.4 °C) (10/27/23 1130)  Pulse: 96 (10/27/23 1133)  Resp: (!) 22 (10/27/23 1133)  BP: 106/61 (10/27/23 1131)  SpO2: 95 % (10/27/23 1133)  Weight: 96.5 kg (212 lb 11.9 oz) (10/27/23 0545)  Height: 5' 10" (177.8 cm) (10/22/23 1624)    PICC  Date/Time: 10/27/2023 2:53 PM  Performed by: Gerard Hankins, RN  Consent Done: Yes  Time out: Immediately prior to procedure a time out was called to verify the correct patient, procedure, equipment, support staff and site/side marked as required  Indications: med administration  Anesthesia: local infiltration  Local anesthetic: lidocaine 1% without epinephrine  Anesthetic Total (mL): 5  Preparation: skin prepped with ChloraPrep  Skin prep agent dried: skin prep agent completely dried prior to procedure  Sterile barriers: all five maximum sterile barriers used - cap, mask, sterile gown, sterile gloves, and large sterile sheet  Hand hygiene: hand hygiene performed prior to central venous catheter insertion  Location details: right basilic  Catheter type: triple lumen  Catheter size: 5 Fr  Catheter Length: 38cm    Number of attempts: 1  Post-procedure: blood return through all ports and sterile dressing applied    Assessment: placement verified by x-ray          Gerard Hnakins< BHAKTI  10/27/2023    "

## 2023-10-27 NOTE — PROGRESS NOTES
[facility]  Medical Surgical Unit  Cardiology  Progress Note    Patient Name: Huang Ramsey  MRN: [id number]  Admission Date: 10/22/2023  Hospital Length of Stay: 3 days  Code Status: Full Code   Attending Provider: Pradeep Do MD   Consulting Provider: CLAIRE Dumont  Primary Care Physician: Pradeep Do MD  Principal Problem:Elevated troponin I measurement    Patient information was obtained from patient and ER records.       Subjective:     Chief Complaint:  SOB     HPI: Patient is a 54 yo male unknown to [facility]. He has a PMH of HTN. He presented to the ED with complaints of SOB that has worsened over the past 2 months. He states that he noticed his whenever he exercises if he has to lay down for an exercise like bench pressing he will get short of breath and at night he gets short of breath when he lays down to sleep. He also reports that over the past couple of days he felt very anxious and can not stop thinking that something is wrong with his heart.  He had some outpatient labs done a month ago which showed a mildly elevated troponin. He has seen Dr Castro and is supposed to have a nuclear stress test. Denies having any chest pain or hemoptysis or lower extremity swelling.    10/24/23: Echocardiogram from Dr Castro was reviewed and shows an EF of ~40%. Patient is on room air and O2 Sats are fine. He is having some anxiety due to hospitalization.   10/26/23: Patient in ICU in stable condition. He was evaluated by psych yesterday due to anxiety issues. He denies any CP or SOB. He is currently resting w/o distress.   10/27/23: Patient in bed without distress. He is still having issues of panic attacks.    Past Medical History:   Diagnosis Date    Hypertension        History reviewed. No pertinent surgical history.    Review of patient's allergies indicates:   Allergen Reactions    Penicillins        No current facility-administered medications on file prior to  encounter.     Current Outpatient Medications on File Prior to Encounter   Medication Sig    amLODIPine (NORVASC) 5 MG tablet Take 5 mg by mouth.    anastrozole (ARIMIDEX) 1 mg Tab Take 1 mg by mouth.    aspirin (ECOTRIN) 81 MG EC tablet Take 81 mg by mouth.    busPIRone (BUSPAR) 30 MG Tab Take 30 mg by mouth 2 (two) times daily.    diazePAM (VALIUM) 5 MG tablet Take 5 mg by mouth daily as needed.    hydroCHLOROthiazide (HYDRODIURIL) 25 MG tablet Take 25 mg by mouth.    potassium chloride SA (K-DUR,KLOR-CON M) 10 MEQ tablet Take 10 mEq by mouth.    sertraline (ZOLOFT) 100 MG tablet      Family History    None       Tobacco Use    Smoking status: Not on file    Smokeless tobacco: Not on file   Substance and Sexual Activity    Alcohol use: Not on file    Drug use: Not on file    Sexual activity: Not on file     Review of Systems   Constitutional: Negative.   Cardiovascular: Negative.    Psychiatric/Behavioral:  The patient is nervous/anxious.      Objective:     Vital Signs (Most Recent):  Temp: 97.5 °F (36.4 °C) (10/27/23 1130)  Pulse: 96 (10/27/23 1133)  Resp: (!) 22 (10/27/23 1133)  BP: 106/61 (10/27/23 1131)  SpO2: 95 % (10/27/23 1133) Vital Signs (24h Range):  Temp:  [96.8 °F (36 °C)-97.7 °F (36.5 °C)] 97.5 °F (36.4 °C)  Pulse:  [] 96  Resp:  [2-47] 22  SpO2:  [85 %-99 %] 95 %  BP: ()/(56-93) 106/61     Weight: 96.5 kg (212 lb 11.9 oz)  Body mass index is 30.53 kg/m².    SpO2: 95 %         Intake/Output Summary (Last 24 hours) at 10/27/2023 1135  Last data filed at 10/27/2023 0738  Gross per 24 hour   Intake 2480.77 ml   Output 1350 ml   Net 1130.77 ml         Lines/Drains/Airways       Peripheral Intravenous Line  Duration                  Peripheral IV - Single Lumen 10/26/23 0930 18 G Anterior;Right Forearm 1 day         Peripheral IV - Single Lumen 10/26/23 1030 20 G Right Antecubital 1 day                    Physical Exam  Constitutional:       General: He is not in acute distress.  Eyes:       Extraocular Movements: Extraocular movements intact.   Cardiovascular:      Rate and Rhythm: Regular rhythm. Tachycardia present.      Heart sounds: No murmur heard.  Pulmonary:      Effort: No respiratory distress.   Musculoskeletal:         General: No swelling. Normal range of motion.   Skin:     General: Skin is warm and dry.   Neurological:      Mental Status: He is alert and oriented to person, place, and time.   Psychiatric:         Mood and Affect: Mood normal.         Behavior: Behavior normal.         Significant Labs: CMP   Recent Labs   Lab 10/26/23  0343 10/27/23  0424    141   K 3.7 3.5   CO2 25 25   BUN 31.0* 19.0   CREATININE 1.59* 1.29*   CALCIUM 8.8 8.8   ALBUMIN 3.4* 3.3*   BILITOT 1.4 1.1   ALKPHOS 48 48   AST 62* 54*   * 133*     , CBC   Recent Labs   Lab 10/26/23  0343 10/27/23  0424   WBC 9.76 8.46   HGB 14.8 14.4   HCT 45.1 44.5    175     , and Troponin   Recent Labs   Lab 10/26/23  0343 10/27/23  0424   TROPONINI 0.095* 0.076*         Significant Imaging:   Assessment and Plan:     Acute systolic CHF  Continue BB   No ACEi/ARB/ARNI s/t renal function   Currently on dobutamine gtt  Elevated troponin, mild  Trend has remained mild and flat  Patient denies any CP  He is awaiting transfer to [place]  HTN  Stable  SALOM[facility]N- improved  Nephrology consulted       Thank you for your consult.   Please call with any further questions    Cassius Marroquin, CLAIRE  Cardiology   [facility] - Medical Surgical Unit

## 2023-10-27 NOTE — PROGRESS NOTES
[facility]                     [address] [address]    PATIENT NAME:       HUANG RAMSEY  YOB: 1970  CSN:                [id number]   MRN:                [id number]  ADMIT DATE:         10/22/2023 23:30:00  PHYSICIAN:          Pradeep Do MD                            PROGRESS NOTE    DATE:      SUBJECTIVE:  Mr. Huang Ramsey in the ICU.  He remains with very severe anxiety.    He has panic attacks.  I spoke with his mother and he is doing much better at   this time.    PHYSICAL EXAMINATION:  VITAL SIGNS:  The patient is on 3 L of oxygen per nasal cannula as pulse ranges   between 87 and 98%.  Blood pressure 121/89, pulse 98.  He is now on 20 mcg of   dopamine infusion.  Labs, x-rays, medications are on the chart and reviewed.  HEART:  Regular rate and rhythm.    LUNGS:  Clear to auscultation bilaterally.    ABDOMEN:  Nontender, nondistended, normoactive bowel sounds.  EXTREMITIES:  No significant edema.    ASSESSMENT:    1. Panic attacks.  2. Myocardial ischemia.  3. Underlying cardiomyopathy.  4. Acute renal insufficiency.  5. Elevated LFTs.  6. History of alcohol abuse.  7. Long history of testosterone abuse.  8. Fatty liver.  9. Tachycardia with hypotension.  10. Mitral valve dysfunction.  11. Multiple other medical problems as per problem list.  12. Low potassium and phosphorus and magnesium.    PLAN:  Continue his dobutamine.  We are trying to get him to [place]   for catheterization.  Continue his O2 and wean as tolerated.  Recheck a.m. labs.    Replace potassium, phosphorus, and magnesium.        ______________________________  Pradeep Do MD    PBS/AQS  DD:  10/27/2023  Time:  04:35PM  DT:  10/27/2023  Time:  05:20PM  Job #:  [id number]      PROGRESS NOTE

## 2023-10-28 ENCOUNTER — HOSPITAL ENCOUNTER (INPATIENT)
Facility: HOSPITAL | Age: 53
LOS: 4 days | Discharge: HOME | DRG: 286 | End: 2023-11-01
Attending: INTERNAL MEDICINE | Admitting: INTERNAL MEDICINE
Payer: COMMERCIAL

## 2023-10-28 VITALS
OXYGEN SATURATION: 95 % | RESPIRATION RATE: 24 BRPM | BODY MASS INDEX: 30.46 KG/M2 | HEIGHT: 70 IN | SYSTOLIC BLOOD PRESSURE: 120 MMHG | HEART RATE: 104 BPM | DIASTOLIC BLOOD PRESSURE: 75 MMHG | TEMPERATURE: 98 F | WEIGHT: 212.75 LBS

## 2023-10-28 DIAGNOSIS — I50.21 ACUTE SYSTOLIC CONGESTIVE HEART FAILURE: Primary | ICD-10-CM

## 2023-10-28 DIAGNOSIS — R79.89 ELEVATED TROPONIN I MEASUREMENT: ICD-10-CM

## 2023-10-28 DIAGNOSIS — R07.89 OTHER CHEST PAIN: ICD-10-CM

## 2023-10-28 DIAGNOSIS — I24.9 ACS (ACUTE CORONARY SYNDROME): ICD-10-CM

## 2023-10-28 LAB
ALBUMIN SERPL-MCNC: 3.3 G/DL (ref 3.5–5)
ALBUMIN/GLOB SERPL: 1.3 RATIO (ref 1.1–2)
ALP SERPL-CCNC: 52 UNIT/L (ref 40–150)
ALT SERPL-CCNC: 109 UNIT/L (ref 0–55)
APTT PPP: 62.5 SECONDS (ref 23.2–33.7)
APTT PPP: 70.1 SECONDS (ref 23.2–33.7)
APTT PPP: 83 SECONDS (ref 23.2–33.7)
AST SERPL-CCNC: 32 UNIT/L (ref 5–34)
BASOPHILS # BLD AUTO: 0.05 X10(3)/MCL
BASOPHILS NFR BLD AUTO: 0.7 %
BILIRUB SERPL-MCNC: 1.2 MG/DL
BUN SERPL-MCNC: 11.9 MG/DL (ref 8.4–25.7)
CALCIUM SERPL-MCNC: 8.9 MG/DL (ref 8.4–10.2)
CHLORIDE SERPL-SCNC: 106 MMOL/L (ref 98–107)
CHOLEST SERPL-MCNC: 159 MG/DL
CHOLEST/HDLC SERPL: 5 {RATIO} (ref 0–5)
CK SERPL-CCNC: 161 U/L (ref 30–200)
CO2 SERPL-SCNC: 28 MMOL/L (ref 22–29)
CREAT SERPL-MCNC: 1.14 MG/DL (ref 0.73–1.18)
EOSINOPHIL # BLD AUTO: 0.32 X10(3)/MCL (ref 0–0.9)
EOSINOPHIL NFR BLD AUTO: 4.3 %
ERYTHROCYTE [DISTWIDTH] IN BLOOD BY AUTOMATED COUNT: 14.1 % (ref 11.5–17)
GFR SERPLBLD CREATININE-BSD FMLA CKD-EPI: >60 MLS/MIN/1.73/M2
GLOBULIN SER-MCNC: 2.5 GM/DL (ref 2.4–3.5)
GLUCOSE SERPL-MCNC: 83 MG/DL (ref 74–100)
HCT VFR BLD AUTO: 42.7 % (ref 42–52)
HDLC SERPL-MCNC: 33 MG/DL (ref 35–60)
HGB BLD-MCNC: 14 G/DL (ref 14–18)
IMM GRANULOCYTES # BLD AUTO: 0.03 X10(3)/MCL (ref 0–0.04)
IMM GRANULOCYTES NFR BLD AUTO: 0.4 %
INR PPP: 1.1
LDLC SERPL CALC-MCNC: 110 MG/DL (ref 50–140)
LYMPHOCYTES # BLD AUTO: 1.13 X10(3)/MCL (ref 0.6–4.6)
LYMPHOCYTES NFR BLD AUTO: 15.2 %
MAGNESIUM SERPL-MCNC: 2.2 MG/DL (ref 1.6–2.6)
MCH RBC QN AUTO: 31.7 PG (ref 27–31)
MCHC RBC AUTO-ENTMCNC: 32.8 G/DL (ref 33–36)
MCV RBC AUTO: 96.8 FL (ref 80–94)
MONOCYTES # BLD AUTO: 0.78 X10(3)/MCL (ref 0.1–1.3)
MONOCYTES NFR BLD AUTO: 10.5 %
NEUTROPHILS # BLD AUTO: 5.12 X10(3)/MCL (ref 2.1–9.2)
NEUTROPHILS NFR BLD AUTO: 68.9 %
NRBC BLD AUTO-RTO: 0 %
PHOSPHATE SERPL-MCNC: 2.6 MG/DL (ref 2.3–4.7)
PLATELET # BLD AUTO: 159 X10(3)/MCL (ref 130–400)
PMV BLD AUTO: 11.2 FL (ref 7.4–10.4)
POTASSIUM SERPL-SCNC: 3.5 MMOL/L (ref 3.5–5.1)
PROT SERPL-MCNC: 5.8 GM/DL (ref 6.4–8.3)
PROTHROMBIN TIME: 13.7 SECONDS (ref 12.5–14.5)
RBC # BLD AUTO: 4.41 X10(6)/MCL (ref 4.7–6.1)
SODIUM SERPL-SCNC: 140 MMOL/L (ref 136–145)
TRIGL SERPL-MCNC: 80 MG/DL (ref 34–140)
VLDLC SERPL CALC-MCNC: 16 MG/DL
WBC # SPEC AUTO: 7.43 X10(3)/MCL (ref 4.5–11.5)

## 2023-10-28 PROCEDURE — 25000003 PHARM REV CODE 250: Performed by: STUDENT IN AN ORGANIZED HEALTH CARE EDUCATION/TRAINING PROGRAM

## 2023-10-28 PROCEDURE — 80053 COMPREHEN METABOLIC PANEL: CPT | Performed by: STUDENT IN AN ORGANIZED HEALTH CARE EDUCATION/TRAINING PROGRAM

## 2023-10-28 PROCEDURE — 63600175 PHARM REV CODE 636 W HCPCS: Performed by: STUDENT IN AN ORGANIZED HEALTH CARE EDUCATION/TRAINING PROGRAM

## 2023-10-28 PROCEDURE — 85730 THROMBOPLASTIN TIME PARTIAL: CPT | Performed by: STUDENT IN AN ORGANIZED HEALTH CARE EDUCATION/TRAINING PROGRAM

## 2023-10-28 PROCEDURE — 20000000 HC ICU ROOM

## 2023-10-28 PROCEDURE — 85025 COMPLETE CBC W/AUTO DIFF WBC: CPT | Performed by: STUDENT IN AN ORGANIZED HEALTH CARE EDUCATION/TRAINING PROGRAM

## 2023-10-28 PROCEDURE — 25000003 PHARM REV CODE 250

## 2023-10-28 PROCEDURE — 82550 ASSAY OF CK (CPK): CPT | Performed by: STUDENT IN AN ORGANIZED HEALTH CARE EDUCATION/TRAINING PROGRAM

## 2023-10-28 PROCEDURE — 87040 BLOOD CULTURE FOR BACTERIA: CPT | Performed by: HOSPITALIST

## 2023-10-28 PROCEDURE — 83735 ASSAY OF MAGNESIUM: CPT | Performed by: STUDENT IN AN ORGANIZED HEALTH CARE EDUCATION/TRAINING PROGRAM

## 2023-10-28 PROCEDURE — 84100 ASSAY OF PHOSPHORUS: CPT | Performed by: STUDENT IN AN ORGANIZED HEALTH CARE EDUCATION/TRAINING PROGRAM

## 2023-10-28 PROCEDURE — 80061 LIPID PANEL: CPT | Performed by: STUDENT IN AN ORGANIZED HEALTH CARE EDUCATION/TRAINING PROGRAM

## 2023-10-28 PROCEDURE — 85610 PROTHROMBIN TIME: CPT | Performed by: STUDENT IN AN ORGANIZED HEALTH CARE EDUCATION/TRAINING PROGRAM

## 2023-10-28 PROCEDURE — 25000003 PHARM REV CODE 250: Performed by: NURSE PRACTITIONER

## 2023-10-28 PROCEDURE — 25000003 PHARM REV CODE 250: Performed by: HOSPITALIST

## 2023-10-28 PROCEDURE — 85730 THROMBOPLASTIN TIME PARTIAL: CPT | Performed by: HOSPITALIST

## 2023-10-28 RX ORDER — HEPARIN SODIUM,PORCINE/D5W 25000/250
0-40 INTRAVENOUS SOLUTION INTRAVENOUS CONTINUOUS
Status: DISCONTINUED | OUTPATIENT
Start: 2023-10-28 | End: 2023-10-30

## 2023-10-28 RX ORDER — DIAZEPAM 5 MG/1
5 TABLET ORAL EVERY 6 HOURS PRN
Status: DISCONTINUED | OUTPATIENT
Start: 2023-10-28 | End: 2023-10-28

## 2023-10-28 RX ORDER — LORAZEPAM 2 MG/ML
2 INJECTION INTRAMUSCULAR
Status: DISCONTINUED | OUTPATIENT
Start: 2023-10-28 | End: 2023-10-28

## 2023-10-28 RX ORDER — METOPROLOL TARTRATE 25 MG/1
25 TABLET, FILM COATED ORAL 2 TIMES DAILY
Status: DISCONTINUED | OUTPATIENT
Start: 2023-10-28 | End: 2023-11-01 | Stop reason: HOSPADM

## 2023-10-28 RX ORDER — SODIUM CHLORIDE 0.9 % (FLUSH) 0.9 %
10 SYRINGE (ML) INJECTION
Status: DISCONTINUED | OUTPATIENT
Start: 2023-10-28 | End: 2023-11-01 | Stop reason: HOSPADM

## 2023-10-28 RX ORDER — DIAZEPAM 5 MG/1
10 TABLET ORAL EVERY 6 HOURS PRN
Status: DISCONTINUED | OUTPATIENT
Start: 2023-10-28 | End: 2023-11-01 | Stop reason: HOSPADM

## 2023-10-28 RX ORDER — DIAZEPAM 5 MG/1
5 TABLET ORAL ONCE
Status: COMPLETED | OUTPATIENT
Start: 2023-10-28 | End: 2023-10-28

## 2023-10-28 RX ORDER — DOBUTAMINE HYDROCHLORIDE 400 MG/100ML
0-20 INJECTION INTRAVENOUS CONTINUOUS
Status: DISCONTINUED | OUTPATIENT
Start: 2023-10-28 | End: 2023-10-30

## 2023-10-28 RX ADMIN — HEPARIN SODIUM 28 UNITS/KG/HR: 10000 INJECTION, SOLUTION INTRAVENOUS at 11:10

## 2023-10-28 RX ADMIN — DIAZEPAM 5 MG: 5 TABLET ORAL at 11:10

## 2023-10-28 RX ADMIN — DOBUTAMINE IN DEXTROSE 15 MCG/KG/MIN: 400 INJECTION, SOLUTION INTRAVENOUS at 02:10

## 2023-10-28 RX ADMIN — DIAZEPAM 5 MG: 5 TABLET ORAL at 01:10

## 2023-10-28 RX ADMIN — DOBUTAMINE IN DEXTROSE 10 MCG/KG/MIN: 400 INJECTION, SOLUTION INTRAVENOUS at 05:10

## 2023-10-28 RX ADMIN — DIAZEPAM 10 MG: 5 TABLET ORAL at 07:10

## 2023-10-28 RX ADMIN — METOPROLOL TARTRATE 25 MG: 25 TABLET, FILM COATED ORAL at 08:10

## 2023-10-28 RX ADMIN — HEPARIN SODIUM 26 UNITS/KG/HR: 10000 INJECTION, SOLUTION INTRAVENOUS at 02:10

## 2023-10-28 RX ADMIN — HEPARIN SODIUM 28 UNITS/KG/HR: 10000 INJECTION, SOLUTION INTRAVENOUS at 01:10

## 2023-10-28 RX ADMIN — DIAZEPAM 5 MG: 5 TABLET ORAL at 02:10

## 2023-10-28 NOTE — Clinical Note
The catheter was inserted into the ostium   left main. Hemodynamics were performed.  An angiography was performed of the left coronary arteries. Multiple views were taken. The angiography was performed via power injection.  [id number]

## 2023-10-28 NOTE — NURSING
Nurses Note -- 4 Eyes      10/28/2023   4:29 PM      Skin assessed during: Daily Assessment      [x] No Altered Skin Integrity Present    []Prevention Measures Documented      [] Yes- Altered Skin Integrity Present or Discovered   [] LDA Added if Not in Epic (Describe Wound)   [] New Altered Skin Integrity was Present on Admit and Documented in LDA   [] Wound Image Taken    Wound Care Consulted? No    Attending Nurse:  Christopher Ibarra RN/Staff Member:   BHAKTI Pena

## 2023-10-28 NOTE — Clinical Note
The catheter was repositioned into the left ventricle. Hemodynamics were performed.  and Pullback was recorded.  4.

## 2023-10-28 NOTE — NURSING
Nurses Note -- 4 Eyes      10/28/2023   4:09 AM      Skin assessed during: Admit      [x] No Altered Skin Integrity Present    [x]Prevention Measures Documented      [] Yes- Altered Skin Integrity Present or Discovered   [] LDA Added if Not in Epic (Describe Wound)   [] New Altered Skin Integrity was Present on Admit and Documented in LDA   [] Wound Image Taken    Wound Care Consulted? No    Attending Nurse:  Nora Ibarra RN/Staff Member:   BHAKTI Ybarra

## 2023-10-28 NOTE — H&P
"[facility] - 7th Floor ICU  Pulmonary Critical Care Note    Patient Name: Huang Ramsey  MRN: [id number]  Admission Date: 10/28/2023  Hospital Length of Stay: 0 days  Code Status: Full Code  Attending Provider: Sachin Storey Jr., MD,*  Primary Care Provider: Pradeep Do MD     Subjective:     HPI:   Mr. Huang Ramsey is a 54 y/o gentleman with PMHx of HTN, HLD, Hx of Etoh, BPH, Hyperprolactinemia/Hypogonadism on testosterone, and AYAN who presents to [facility] ICU as transfer from [facility] for further cardiological evaluation in the setting of HFrEF w/ cardiogenic shock requiring dobutamine support.     Per chart review; patient initially presented to outside facility with complaints of worsening shortness of breath with exercise (lying flat to perform bench press, difficulty with recovery after boxing) for approximately x2 months.  Also reported previous few days of increased anxiety and worry regarding possible cardiac condition.  Also noted that patient was seen by Dr. Castro recently with future plans performing nuclear stress test.  Upon admission, patient denied any chest pain, palpitations, hemoptysis, or lower extremity swelling.    Upon further interview in ICU; patient reports having a "leaky valve" in which she was referred to Dr. Castro approximately 20 years ago, on/off with follow-ups.  He reports that over approximately the past year he has been experiencing above-mentioned shortness of breath though worsened acutely.  Does report recent x3 pillow orthopnea with intermittent PND.  Has also noticed recent B/L lower extremity edema in the evening.  Currently denies any headache, lightheadedness/dizziness, nausea/vomiting, chest pain, palpitations.  Does have mild shortness of breath at rest.    Of note; patient reports extensive cardiovascular disease amongst his maternal side of family.  Maternal grandfather passed away from MI (at 51 y/o).  Father passed away from " MI (at 31 y/o).  Also had maternal uncle passed away from myocardial infarction.     Family Hx  Mother: N/A  Father: HTN, HLD, MI ( at 31 y/o)  Maternal Grandfather: MI ( at 49 y/o)    Hospital Course/Significant events:  10/24/23: Echocardiogram from Dr Castro was reviewed and shows an EF of ~40%. Patient is on room air and O2 Sats are fine. He is having some anxiety due to hospitalization.   10/26/23: Patient in ICU in stable condition. He was evaluated by psych yesterday due to anxiety issues. He denies any CP or SOB. He is currently resting w/o distress.   10/27/23: Patient transferred from [facility] to [facility] ICU for further cardiological workup    24 Hour Interval History:  N/A    Past Medical History:   Diagnosis Date    Hypertension        No past surgical history on file.    Social History     Socioeconomic History    Marital status:      Social Determinants of Health     Financial Resource Strain: Unknown (10/23/2023)    Overall Financial Resource Strain (CARDIA)     Difficulty of Paying Living Expenses: Patient refused   Food Insecurity: Unknown (10/23/2023)    Hunger Vital Sign     Worried About Running Out of Food in the Last Year: Patient refused     Ran Out of Food in the Last Year: Patient refused   Transportation Needs: Unknown (10/23/2023)    PRAPARE - Transportation     Lack of Transportation (Medical): Patient refused     Lack of Transportation (Non-Medical): Patient refused   Physical Activity: Unknown (10/23/2023)    Exercise Vital Sign     Days of Exercise per Week: Patient refused     Minutes of Exercise per Session: Patient refused   Stress: Unknown (10/23/2023)    [employer] [place] [employer] - Occupational Stress Questionnaire     Feeling of Stress : Patient refused   Social Connections: Unknown (10/23/2023)    Social Connection and Isolation Panel [NHANES]     Frequency of Communication with Friends and Family: Patient refused     Frequency of Social  Gatherings with Friends and Family: Patient refused     Attends Denominational Services: Patient refused     Active Member of Clubs or Organizations: Patient refused     Attends Club or Organization Meetings: Patient refused     Marital Status: Patient refused   Housing Stability: Unknown (10/23/2023)    Housing Stability Vital Sign     Unable to Pay for Housing in the Last Year: Patient refused     Unstable Housing in the Last Year: Patient refused           Current Outpatient Medications   Medication Instructions    amLODIPine (NORVASC) 5 mg, Oral    anastrozole (ARIMIDEX) 1 mg, Oral    aspirin (ECOTRIN) 81 mg, Oral    busPIRone (BUSPAR) 30 mg, Oral, 2 times daily    diazePAM (VALIUM) 5 mg, Oral, Daily PRN    hydroCHLOROthiazide (HYDRODIURIL) 25 mg, Oral    potassium chloride SA (K-DUR,KLOR-CON M) 10 MEQ tablet 10 mEq, Oral    sertraline (ZOLOFT) 100 MG tablet No dose, route, or frequency recorded.       Current Inpatient Medications      Current Intravenous Infusions   DOBUTamine IV infusion (titrating) 15 mcg/kg/min (10/28/23 0205)    heparin (porcine) in D5W 26 Units/kg/hr (10/28/23 0228)         Review of Systems   Constitutional:  Negative for chills and fever.   Eyes:  Negative for blurred vision and double vision.   Respiratory:  Positive for cough (dry, non-productive) and shortness of breath (w/ exercise). Negative for hemoptysis, sputum production and wheezing.    Cardiovascular:  Positive for orthopnea (x3 pillow), leg swelling (intermittent) and PND (intermittent). Negative for chest pain, palpitations and claudication.   Gastrointestinal:  Negative for abdominal pain, blood in stool, constipation, diarrhea, heartburn, melena, nausea and vomiting.   Genitourinary:  Positive for frequency. Negative for dysuria.   Neurological:  Negative for dizziness, loss of consciousness, weakness and headaches.   Psychiatric/Behavioral:  The patient is nervous/anxious.           Objective:     No intake or output data in  the 24 hours ending 10/28/23 0315      Vital Signs (Most Recent):  Temp: 99.1 °F (37.3 °C) (10/28/23 0305)  Pulse: 94 (10/28/23 0305)  Resp: (!) 31 (10/28/23 0305)  BP: (!) 135/92 (10/28/23 0305)  SpO2: 96 % (10/28/23 0305)  Body mass index is 31 kg/m².  Weight: 98 kg (216 lb 0.8 oz) Vital Signs (24h Range):  Temp:  [97.5 °F (36.4 °C)-99.1 °F (37.3 °C)] 99.1 °F (37.3 °C)  Pulse:  [] 94  Resp:  [9-50] 31  SpO2:  [83 %-99 %] 96 %  BP: ()/(61-92) 135/92     Physical Exam  Constitutional:       General: He is not in acute distress.     Appearance: Normal appearance. He is ill-appearing. He is not toxic-appearing or diaphoretic.   HENT:      Head: Normocephalic and atraumatic.      Mouth/Throat:      Mouth: Mucous membranes are moist.      Pharynx: No oropharyngeal exudate.   Eyes:      Extraocular Movements: Extraocular movements intact.      Pupils: Pupils are equal, round, and reactive to light.   Cardiovascular:      Rate and Rhythm: Regular rhythm. Tachycardia present.      Pulses: Normal pulses.      Comments: No JVD appreciated  Pulmonary:      Effort: Pulmonary effort is normal. No respiratory distress.      Breath sounds: Rales (B/L posterior lower lung fields) present. No wheezing or rhonchi.   Abdominal:      General: Abdomen is flat. There is no distension.      Palpations: Abdomen is soft.      Tenderness: There is no abdominal tenderness.   Musculoskeletal:         General: No swelling.      Right lower leg: No edema.      Left lower leg: No edema.   Skin:     Capillary Refill: Capillary refill takes less than 2 seconds.      Coloration: Skin is not jaundiced.   Neurological:      General: No focal deficit present.      Mental Status: He is alert and oriented to person, place, and time.      Motor: No weakness.   Psychiatric:         Mood and Affect: Mood normal.         Behavior: Behavior normal.         Thought Content: Thought content normal.         Judgment: Judgment normal.            Lines/Drains/Airways       Peripherally Inserted Central Catheter Line  Duration             PICC Triple Lumen 10/27/23 1452 right basilic <1 day              Peripheral Intravenous Line  Duration                  Peripheral IV - Single Lumen 10/26/23 0930 18 G Anterior;Right Forearm 1 day         Peripheral IV - Single Lumen 10/26/23 1030 20 G Right Antecubital 1 day                    Significant Labs:    Lab Results   Component Value Date    WBC 8.46 10/27/2023    HGB 14.4 10/27/2023    HCT 44.5 10/27/2023    MCV 97.6 (H) 10/27/2023     10/27/2023           BMP  Lab Results   Component Value Date     10/27/2023    K 3.5 10/27/2023    CHLORIDE 107 10/27/2023    CO2 25 10/27/2023    BUN 19.0 10/27/2023    CREATININE 1.29 (H) 10/27/2023    CALCIUM 8.8 10/27/2023    AGAP 14.0 10/22/2023    EGFRNONAA 52 10/15/2021         ABG  Recent Labs   Lab 10/25/23  1238   PH 7.472*   PO2 63*   PCO2 22.1*   HCO3 16.1*       Mechanical Ventilation Support:         Significant Imaging:  I have reviewed the pertinent imaging within the past 24 hours.        Assessment/Plan:     Assessment  HFrEF (EF approximately 40%, 10/24/23) w/ suspected Cardiogenic Shock requiring Dobutamine gtt  NSTEMI w/ RBBB & Left Axis Deviation  Possibly Type II demand in etiology though elevated suspicion for ischemic disease given EF and extensive family cardiac disease  JODIE Score (3)  SALOMÓN - improved  Transaminitis - worsening  Possibly 2/2 above cardiogenic shock  Hx of HTN, HLD  Hx of Chronic Etoh Abuse  Recent Mild Rhabdo - improved  Recent Anxiety-Related Difficulties  Hx of Hyperprolactinemia/Hypogonadism on testosterone  Reported AYAN    Plan  - Patient admitted to ICU for close monitoring in the setting of cardiogenic shock requiring dobutamine gtt; pending further cardiological evaluation in a.m.  - C/w dobutamine gtt at this time; current rate at 15 micrograms/milligram per hour, continue to wean as tolerated to maintain  goal in MAP> 65   - C/w Heparin gtt for above NSTEMI  - Consult to CIS placed; pending further evaluation in a.m. for possibly anigography  - troponins serially downtrended from peak of (0.162) to (0.076); will defer further trending to cardiology in a.m.  - strict I&Os, daily weights   - Continue to monitor renal function/LFTs closely  - CIWA protocol in place with history of chronic EtOH abuse (Valium p.r.n. 2/2 Ativan shortage)  - In setting of above recent anxiety related difficulties and receiving Vistaril/Librium at outside facility; will proceed with Valium p.r.n. under CIWA protocol and hold further Vistaril this time secondary to prolonged QTC of (532)  - Due to patient not having TTE on file; plan to obtain once off dobutamine gtt in addittion to reported history of valvular disease    DVT Prophylaxis: Heparin gtt  GI Prophylaxis: N/A     32 minutes of critical care was time spent personally by me on the following activities: development of treatment plan with patient or surrogate and bedside caregivers, discussions with consultants, evaluation of patient's response to treatment, examination of patient, ordering and performing treatments and interventions, ordering and review of laboratory studies, ordering and review of radiographic studies, pulse oximetry, re-evaluation of patient's condition.  This critical care time did not overlap with that of any other provider or involve time for any procedures.     Baldomero Ray MD  Pulmonary Critical Care Medicine  [facility] - 7th Floor ICU  DOS: 10/28/2023

## 2023-10-28 NOTE — NURSING
[facility]  Notified Nora YA of pts departure.

## 2023-10-28 NOTE — Clinical Note
The catheter was inserted into the ostium   right coronary artery. An angiography was performed of the right coronary arteries. Multiple views were taken. The angiography was performed via power injection.  FEI

## 2023-10-28 NOTE — CONSULTS
Cardiovascular Admission H&P    Patient Name: Huang Ramsey  Age: 53 y.o.  : 1970  MRN: [id number]  Admission Date: 10/28/2023  Primary Cardiologist: MARIE Castro MD   ?  Chief Complaint: Transfer from [facility] for evaluation and poss angiogram       History of Present Illness:  Huang Ramsey is a 53 y.o. male with PMHx of HTN, HLD, Hx of EtOH dependency, anxiety, panic attacks, CHF EF 35-40%, BPH,and AYAN who was  transferred from [facility] for further cards evaluation       Patient was given Valium IV- so Hx was taken from reader/ chart notes:     Pt presented on 10/23 to [facility] with complaints of increasing SOB with exercise and difficulty lying flat after boxing for about 2 months, orthopnea. Notes state increased anxiety with worsening due to worrying about heart issues.     Pt was due to have stress test as OP from notes, also positive family hx of cardiovascular disease (father MI 31 yo,  MGF MI age 50)    Pt was started on dobutamine and heparin gtt at [facility]- currently Dobutamine decreased to 10 mcg/kg/min, continue on heparin gtt, resting, arousal but goes back to sleep, on NC 2 liters, NAD noted - answers simple yes /no questions- denies SOB and chest pain at present     No previous angiograms     Outside studies - [facility]  12 lead EKG - ST, LAD, RBBB. No acute ST or T wave, old inferior and anterior changes     Echo (office) - LV moderately dilated EF 35-40%, LAE, RV dilation , mod pulm HTN RVSP 63 mmhg    Drug screen- + Benzo and Cannabinoids     CXR - borderline CMO with central pulmonary vasculature, Atelectasis and/or scarring lower lungs     CT chest - negative for PE, trace pleural effusion , early interstitial edema      Review of Systems:  Review of Systems - limited due to sedation from valium     Health Status  Review of patient's allergies indicates:   Allergen Reactions    Penicillins        Past Medical History:   Diagnosis Date    Hypertension        Current  Facility-Administered Medications   Medication Dose Route Frequency Provider Last Rate Last Admin    diazePAM tablet 5 mg  5 mg Oral Q6H PRN Baldomero Ray MD   5 mg at 10/28/23 0248    DOBUtamine 1000 mg in D5W 250 mL infusion  0-20 mcg/kg/min Intravenous Continuous Baldomero Ray MD 14.7 mL/hr at 10/28/23 0400 10 mcg/kg/min at 10/28/23 0400    heparin 25,000 units in dextrose 5% (100 units/ml) IV bolus from bag - ADDITIONAL PRN BOLUS - 30 units/kg (max bolus 4000 units)  30 Units/kg (Adjusted) Intravenous PRN Baldomero Ray MD        heparin 25,000 units in dextrose 5% (100 units/ml) IV bolus from bag - ADDITIONAL PRN BOLUS - 60 units/kg (max bolus 4000 units)  48.2 Units/kg (Adjusted) Intravenous PRN Baldomero Ray MD        heparin 25,000 units in dextrose 5% 250 mL (100 units/mL) infusion LOW INTENSITY nomogram - LAF  0-40 Units/kg/hr (Adjusted) Intravenous Continuous Baldomeor Ray MD 21.6 mL/hr at 10/28/23 0228 26 Units/kg/hr at 10/28/23 0228    sodium chloride 0.9% flush 10 mL  10 mL Intravenous PRN Baldomero Ray MD           No family history on file.    No past surgical history on file.    Social History     Socioeconomic History    Marital status:      Social Determinants of Health     Financial Resource Strain: Unknown (10/23/2023)    Overall Financial Resource Strain (CARDIA)     Difficulty of Paying Living Expenses: Patient refused   Food Insecurity: Unknown (10/23/2023)    Hunger Vital Sign     Worried About Running Out of Food in the Last Year: Patient refused     Ran Out of Food in the Last Year: Patient refused   Transportation Needs: Unknown (10/23/2023)    PRAPARE - Transportation     Lack of Transportation (Medical): Patient refused     Lack of Transportation (Non-Medical): Patient refused   Physical Activity: Unknown (10/23/2023)    Exercise Vital Sign     Days of Exercise per Week: Patient refused     Minutes of Exercise per Session: Patient refused   Stress:  "Unknown (10/23/2023)    [employer] [place] [employer] - Occupational Stress Questionnaire     Feeling of Stress : Patient refused   Social Connections: Unknown (10/23/2023)    Social Connection and Isolation Panel [NHANES]     Frequency of Communication with Friends and Family: Patient refused     Frequency of Social Gatherings with Friends and Family: Patient refused     Attends Mu-ism Services: Patient refused     Active Member of Clubs or Organizations: Patient refused     Attends Club or Organization Meetings: Patient refused     Marital Status: Patient refused   Housing Stability: Unknown (10/23/2023)    Housing Stability Vital Sign     Unable to Pay for Housing in the Last Year: Patient refused     Unstable Housing in the Last Year: Patient refused       Physical Examination:  Vital signs:  Temp:  [97.5 °F (36.4 °C)-99.1 °F (37.3 °C)] 99.1 °F (37.3 °C)  Pulse:  [] 88  Resp:  [9-50] 21  SpO2:  [83 %-99 %] 97 %  BP: ()/(61-92) 115/86  Patient Vitals for the past 8 hrs:   BP Temp Temp src Pulse Resp SpO2 Height Weight   10/28/23 0600 115/86 -- -- 88 (!) 21 97 % -- --   10/28/23 0530 116/76 -- -- 89 (!) 27 96 % -- --   10/28/23 0505 97/65 -- -- 88 18 97 % -- --   10/28/23 0430 98/70 -- -- 88 (!) 31 96 % -- --   10/28/23 0400 100/73 -- -- 92 (!) 32 96 % -- --   10/28/23 0305 (!) 135/92 99.1 °F (37.3 °C) Oral 94 (!) 31 96 % -- --   10/28/23 0251 124/80 99.1 °F (37.3 °C) -- 97 (!) 24 98 % 5' 10" (1.778 m) 98 kg (216 lb 0.8 oz)   10/28/23 0211 124/80 99.1 °F (37.3 °C) Oral 97 -- 98 % 5' 10" (1.778 m) 98 kg (216 lb 0.8 oz)          No results found for this or any previous visit (from the past 24 hour(s)).  [unfilled]  Wt Readings from Last 3 Encounters:   10/28/23 98 kg (216 lb 0.8 oz)   10/27/23 96.5 kg (212 lb 11.9 oz)   11/08/21 98.9 kg (218 lb 0.6 oz)         Physical Exam   Constitutional: Sedated  and well-developed, well-nourished, and in no acute distress. Able to lay flat "   Cardiovascular: Normal rate, regular rhythm and normal heart sounds. NSR, Soft murmur noted   Pulmonary/Chest: Effort normal and breath sounds normal. CTA, NC 2 liters  Abdominal: Soft. Bowel sounds are normal. There is no tenderness.   Skin: Skin is warm and dry.         Assessment:  Acute systolic CHF  -EF 35-40 %, diuresis as needed  - elevated BNP in admit 1469  -?BB  start low dose BB Metoprolol 25 mg BID   -No ACEi/ARB/ARNI s/t renal function   -Currently on dobutamine gtt- weaning down     Elevated troponin, mild  -Trend has remained mild and flat (High 0.168, last 0.076)  -Patient denies any CP  - 12 lead from outside - no acute ST or T wave, showed old changes   - remains on heparin gtt    Elevated d-dimer  - CT chest negative for PE     HTN  -Stable  - monitor   -previously on Amlodipine 5 mg     SALOMÓN- improved  - Nephrology evaluated at [facility]   - monitor last creatinine 1.29    Anxiety/Panic attacks    Tena Matt

## 2023-10-28 NOTE — NURSING
Spoke with Roger Hernandez from [facility], pt has acceptance and room . Informed wife Asia of room number and transfer tonight.

## 2023-10-28 NOTE — CONSULTS
Cardiovascular Admission H&P    Patient Name: Huang Ramsey  Age: 53 y.o.  : 1970  MRN: [id number]  Admission Date: 10/28/2023  Primary Cardiologist: MARIE Castro MD   ?  Chief Complaint: Transfer from [facility] for evaluation and poss angiogram       History of Present Illness:  Huang Ramsey is a 53 y.o. male with PMHx of HTN, HLD, Hx of EtOH dependency, anxiety, panic attacks, CHF EF 35-40%, BPH,and AYAN who was  transferred from [facility] for further cards evaluation       Patient was given Valium IV- so Hx was taken from reader/ chart notes:     Pt presented on 10/23 to [facility] with complaints of increasing SOB with exercise and difficulty lying flat after boxing for about 2 months, orthopnea. Notes state increased anxiety with worsening due to worrying about heart issues.     Pt was due to have stress test as OP from notes, also positive family hx of cardiovascular disease (father MI 31 yo,  MGF MI age 50)    Pt was started on dobutamine and heparin gtt at [facility]- currently Dobutamine decreased to 10 mcg/kg/min, continue on heparin gtt, resting, arousal but goes back to sleep, on NC 2 liters, NAD noted - answers simple yes /no questions- denies SOB and chest pain at present     No previous angiograms     Outside studies - [facility]  12 lead EKG - ST, LAD, RBBB. No acute ST or T wave, old inferior and anterior changes     Echo (office) - LV moderately dilated EF 35-40%, LAE, RV dilation , mod pulm HTN RVSP 63 mmhg    Drug screen- + Benzo and Cannabinoids     CXR - borderline CMO with central pulmonary vasculature, Atelectasis and/or scarring lower lungs     CT chest - negative for PE, trace pleural effusion , early interstitial edema      Review of Systems:  Review of Systems - limited due to sedation from valium     Health Status  Review of patient's allergies indicates:   Allergen Reactions    Penicillins        Past Medical History:   Diagnosis Date    Hypertension        Current  Facility-Administered Medications   Medication Dose Route Frequency Provider Last Rate Last Admin    diazePAM tablet 5 mg  5 mg Oral Q6H PRN Baldomero Ray MD   5 mg at 10/28/23 0248    DOBUtamine 1000 mg in D5W 250 mL infusion  0-20 mcg/kg/min Intravenous Continuous Baldomero Ray MD 14.7 mL/hr at 10/28/23 0400 10 mcg/kg/min at 10/28/23 0400    heparin 25,000 units in dextrose 5% (100 units/ml) IV bolus from bag - ADDITIONAL PRN BOLUS - 30 units/kg (max bolus 4000 units)  30 Units/kg (Adjusted) Intravenous PRN Baldomero Ray MD        heparin 25,000 units in dextrose 5% (100 units/ml) IV bolus from bag - ADDITIONAL PRN BOLUS - 60 units/kg (max bolus 4000 units)  48.2 Units/kg (Adjusted) Intravenous PRN Baldomero Ray MD        heparin 25,000 units in dextrose 5% 250 mL (100 units/mL) infusion LOW INTENSITY nomogram - LAF  0-40 Units/kg/hr (Adjusted) Intravenous Continuous Baldomero Ray MD 21.6 mL/hr at 10/28/23 0228 26 Units/kg/hr at 10/28/23 0228    sodium chloride 0.9% flush 10 mL  10 mL Intravenous PRN Baldomero Ray MD           No family history on file.    No past surgical history on file.    Social History     Socioeconomic History    Marital status:      Social Determinants of Health     Financial Resource Strain: Unknown (10/23/2023)    Overall Financial Resource Strain (CARDIA)     Difficulty of Paying Living Expenses: Patient refused   Food Insecurity: Unknown (10/23/2023)    Hunger Vital Sign     Worried About Running Out of Food in the Last Year: Patient refused     Ran Out of Food in the Last Year: Patient refused   Transportation Needs: Unknown (10/23/2023)    PRAPARE - Transportation     Lack of Transportation (Medical): Patient refused     Lack of Transportation (Non-Medical): Patient refused   Physical Activity: Unknown (10/23/2023)    Exercise Vital Sign     Days of Exercise per Week: Patient refused     Minutes of Exercise per Session: Patient refused   Stress:  "Unknown (10/23/2023)    [employer] [place] [employer] - Occupational Stress Questionnaire     Feeling of Stress : Patient refused   Social Connections: Unknown (10/23/2023)    Social Connection and Isolation Panel [NHANES]     Frequency of Communication with Friends and Family: Patient refused     Frequency of Social Gatherings with Friends and Family: Patient refused     Attends Yazidi Services: Patient refused     Active Member of Clubs or Organizations: Patient refused     Attends Club or Organization Meetings: Patient refused     Marital Status: Patient refused   Housing Stability: Unknown (10/23/2023)    Housing Stability Vital Sign     Unable to Pay for Housing in the Last Year: Patient refused     Unstable Housing in the Last Year: Patient refused       Physical Examination:  Vital signs:  Temp:  [97.5 °F (36.4 °C)-99.1 °F (37.3 °C)] 99.1 °F (37.3 °C)  Pulse:  [] 88  Resp:  [9-50] 18  SpO2:  [83 %-99 %] 97 %  BP: ()/(61-92) 97/65  Patient Vitals for the past 8 hrs:   BP Temp Temp src Pulse Resp SpO2 Height Weight   10/28/23 0505 97/65 -- -- 88 18 97 % -- --   10/28/23 0430 98/70 -- -- 88 (!) 31 96 % -- --   10/28/23 0400 100/73 -- -- 92 (!) 32 96 % -- --   10/28/23 0305 (!) 135/92 99.1 °F (37.3 °C) Oral 94 (!) 31 96 % -- --   10/28/23 0251 124/80 99.1 °F (37.3 °C) -- 97 (!) 24 98 % 5' 10" (1.778 m) 98 kg (216 lb 0.8 oz)   10/28/23 0211 124/80 99.1 °F (37.3 °C) Oral 97 -- 98 % 5' 10" (1.778 m) 98 kg (216 lb 0.8 oz)        No results found for this or any previous visit (from the past 24 hour(s)).  [unfilled]  Wt Readings from Last 3 Encounters:   10/28/23 98 kg (216 lb 0.8 oz)   10/27/23 96.5 kg (212 lb 11.9 oz)   11/08/21 98.9 kg (218 lb 0.6 oz)         Physical Exam   Constitutional: Sedated  and well-developed, well-nourished, and in no acute distress. Able to lay flat   Cardiovascular: Normal rate, regular rhythm and normal heart sounds. NSR, Soft murmur noted   Pulmonary/Chest: " Effort normal and breath sounds normal. CTA, NC 2 liters  Abdominal: Soft. Bowel sounds are normal. There is no tenderness.   Skin: Skin is warm and dry.         Assessment:  Acute systolic CHF  -EF 35-40 %, diuresis as needed  - elevated BNP in admit 1469  -?BB  start low dose BB Metoprolol 25 mg BID   -No ACEi/ARB/ARNI s/t renal function   -Currently on dobutamine gtt- weaning down     Elevated troponin, mild  -Trend has remained mild and flat (High 0.168, last 0.076)  -Patient denies any CP  - 12 lead from outside - no acute ST or T wave, showed old changes   - remains on heparin gtt    Elevated d-dimer  - CT chest negative for PE     HTN  -Stable  - monitor   -previously on Amlodipine 5 mg     SALOMÓN- improved  - Nephrology evaluated at [facility]   - monitor last creatinine 1.29    Anxiety/Panic attacks    Tena Matt

## 2023-10-28 NOTE — NURSING
Wife @ bedside throughout the day. Stated only needed an evening update if something significant happened.

## 2023-10-29 LAB
ALBUMIN SERPL-MCNC: 3.3 G/DL (ref 3.5–5)
ALBUMIN/GLOB SERPL: 1.2 RATIO (ref 1.1–2)
ALP SERPL-CCNC: 55 UNIT/L (ref 40–150)
ALT SERPL-CCNC: 89 UNIT/L (ref 0–55)
APTT PPP: 85.2 SECONDS (ref 23.2–33.7)
APTT PPP: 86.5 SECONDS (ref 23.2–33.7)
AST SERPL-CCNC: 24 UNIT/L (ref 5–34)
BASOPHILS # BLD AUTO: 0.04 X10(3)/MCL
BASOPHILS NFR BLD AUTO: 0.6 %
BILIRUB SERPL-MCNC: 1 MG/DL
BUN SERPL-MCNC: 9.4 MG/DL (ref 8.4–25.7)
CALCIUM SERPL-MCNC: 9.2 MG/DL (ref 8.4–10.2)
CHLORIDE SERPL-SCNC: 106 MMOL/L (ref 98–107)
CO2 SERPL-SCNC: 24 MMOL/L (ref 22–29)
CREAT SERPL-MCNC: 0.98 MG/DL (ref 0.73–1.18)
EOSINOPHIL # BLD AUTO: 0.33 X10(3)/MCL (ref 0–0.9)
EOSINOPHIL NFR BLD AUTO: 5.3 %
ERYTHROCYTE [DISTWIDTH] IN BLOOD BY AUTOMATED COUNT: 14.1 % (ref 11.5–17)
GFR SERPLBLD CREATININE-BSD FMLA CKD-EPI: >60 MLS/MIN/1.73/M2
GLOBULIN SER-MCNC: 2.7 GM/DL (ref 2.4–3.5)
GLUCOSE SERPL-MCNC: 75 MG/DL (ref 74–100)
HCT VFR BLD AUTO: 44.8 % (ref 42–52)
HGB BLD-MCNC: 14.6 G/DL (ref 14–18)
IMM GRANULOCYTES # BLD AUTO: 0.02 X10(3)/MCL (ref 0–0.04)
IMM GRANULOCYTES NFR BLD AUTO: 0.3 %
LYMPHOCYTES # BLD AUTO: 1.14 X10(3)/MCL (ref 0.6–4.6)
LYMPHOCYTES NFR BLD AUTO: 18.5 %
MAGNESIUM SERPL-MCNC: 1.9 MG/DL (ref 1.6–2.6)
MCH RBC QN AUTO: 31.5 PG (ref 27–31)
MCHC RBC AUTO-ENTMCNC: 32.6 G/DL (ref 33–36)
MCV RBC AUTO: 96.6 FL (ref 80–94)
MONOCYTES # BLD AUTO: 0.66 X10(3)/MCL (ref 0.1–1.3)
MONOCYTES NFR BLD AUTO: 10.7 %
NEUTROPHILS # BLD AUTO: 3.98 X10(3)/MCL (ref 2.1–9.2)
NEUTROPHILS NFR BLD AUTO: 64.6 %
NRBC BLD AUTO-RTO: 0 %
PHOSPHATE SERPL-MCNC: 3 MG/DL (ref 2.3–4.7)
PLATELET # BLD AUTO: 174 X10(3)/MCL (ref 130–400)
PMV BLD AUTO: 11.8 FL (ref 7.4–10.4)
POTASSIUM SERPL-SCNC: 3.4 MMOL/L (ref 3.5–5.1)
PROT SERPL-MCNC: 6 GM/DL (ref 6.4–8.3)
RBC # BLD AUTO: 4.64 X10(6)/MCL (ref 4.7–6.1)
SODIUM SERPL-SCNC: 139 MMOL/L (ref 136–145)
WBC # SPEC AUTO: 6.17 X10(3)/MCL (ref 4.5–11.5)

## 2023-10-29 PROCEDURE — 63600175 PHARM REV CODE 636 W HCPCS: Performed by: STUDENT IN AN ORGANIZED HEALTH CARE EDUCATION/TRAINING PROGRAM

## 2023-10-29 PROCEDURE — 83735 ASSAY OF MAGNESIUM: CPT | Performed by: STUDENT IN AN ORGANIZED HEALTH CARE EDUCATION/TRAINING PROGRAM

## 2023-10-29 PROCEDURE — 25000003 PHARM REV CODE 250

## 2023-10-29 PROCEDURE — 85025 COMPLETE CBC W/AUTO DIFF WBC: CPT | Performed by: STUDENT IN AN ORGANIZED HEALTH CARE EDUCATION/TRAINING PROGRAM

## 2023-10-29 PROCEDURE — 85730 THROMBOPLASTIN TIME PARTIAL: CPT | Performed by: STUDENT IN AN ORGANIZED HEALTH CARE EDUCATION/TRAINING PROGRAM

## 2023-10-29 PROCEDURE — 80053 COMPREHEN METABOLIC PANEL: CPT | Performed by: STUDENT IN AN ORGANIZED HEALTH CARE EDUCATION/TRAINING PROGRAM

## 2023-10-29 PROCEDURE — 25000003 PHARM REV CODE 250: Performed by: NURSE PRACTITIONER

## 2023-10-29 PROCEDURE — 97165 OT EVAL LOW COMPLEX 30 MIN: CPT

## 2023-10-29 PROCEDURE — 85730 THROMBOPLASTIN TIME PARTIAL: CPT | Performed by: HOSPITALIST

## 2023-10-29 PROCEDURE — 20000000 HC ICU ROOM

## 2023-10-29 PROCEDURE — 84100 ASSAY OF PHOSPHORUS: CPT | Performed by: STUDENT IN AN ORGANIZED HEALTH CARE EDUCATION/TRAINING PROGRAM

## 2023-10-29 RX ADMIN — DIAZEPAM 10 MG: 5 TABLET ORAL at 06:10

## 2023-10-29 RX ADMIN — DOBUTAMINE IN DEXTROSE 5 MCG/KG/MIN: 400 INJECTION, SOLUTION INTRAVENOUS at 02:10

## 2023-10-29 RX ADMIN — METOPROLOL TARTRATE 25 MG: 25 TABLET, FILM COATED ORAL at 08:10

## 2023-10-29 RX ADMIN — HEPARIN SODIUM 28 UNITS/KG/HR: 10000 INJECTION, SOLUTION INTRAVENOUS at 10:10

## 2023-10-29 RX ADMIN — DIAZEPAM 10 MG: 5 TABLET ORAL at 05:10

## 2023-10-29 RX ADMIN — DIAZEPAM 10 MG: 5 TABLET ORAL at 12:10

## 2023-10-29 RX ADMIN — HEPARIN SODIUM 28 UNITS/KG/HR: 10000 INJECTION, SOLUTION INTRAVENOUS at 09:10

## 2023-10-29 RX ADMIN — DIAZEPAM 10 MG: 5 TABLET ORAL at 11:10

## 2023-10-29 NOTE — DISCHARGE SUMMARY
[facility]                     [address] [id number]    PATIENT NAME:       SACHIN DOYLE  YOB: 1970  CSN:                [id number]   MRN:                [id number]  ADMIT DATE:         10/22/2023 23:30:00  PHYSICIAN:          Pradeep Do MD                          DISCHARGE SUMMARY    DATE OF DISCHARGE:  10/28/2023 01:10:00    HOSPITAL COURSE:  He is a 53-year-old male who has been having a lot of problems   with shortness of breath and anxiety.  Recently, he was having chronic   constipation, which he attributed to Flomax.  He was taking a large amount of   Lomotil for this.  We discontinued the Flomax and Lomotil and he has had no   further diarrhea.  However, his palpitations and shortness of breath and anxiety   have increased.  He was seen in the emergency room at [facility]   and his troponin was elevated at 0.16.  He had an elevated BNP as well.  He was   admitted to the Medicine floor to rule out myocardial ischemia and workup of his   anxiety and shortness of breath.  He was also tachycardic.  I received a phone   call during the night stating that his heart rate was up to 120s.  He had not   been on telemetry prior to that.  Telemetry was started and we saw no evidence   of his heart rate being in the 120s, but it was in the low 100s.  His anxiety   and palpitations did not seem to correlate with his mild tachycardia.  He also   had severe anxiety, insomnia.  We gave him large amounts of Valium.  We gave him   Haldol multiple times.  Also, he received Vistaril and Benadryl, but these did   not help.  He was transferred to the ICU secondary to his continued shortness of   breath and a feeling of doom with a positive troponin, although it was trending   downward.  His blood pressure is usually high, but became low with all of these   medications.  Cardiology was  consulted and felt that he had acute systolic   congestive heart failure and suggested a beta-blocker and continuing his Diovan.    They recommended continuing monitoring his troponin and controlling his   anxiety.  He did develop acute kidney injury and Dr. Harrington was consulted.    Dr. Harrington recommended continuing dobutamine as he had already been placed in   ICU by this time, and he was hypotensive.  The dobutamine helped with this   congestion of his lungs and also helped with his kidney function.  For a brief   time, he was put on Entresto during hospitalization.  This was stopped as his   creatinine was increasing.  The patient has a long history of alcohol abuse and   testosterone abuse.  In the ICU, we gave him Librium, which seemed to help him   rest and he did not need any further anxiolytics.  His blood pressure rebounded   and was stable prior to transfer.  We were starting to wean his dobutamine.  He   was resting.  His troponins were continuing to trend downward.  Dr. Castro   suggested going to [place] for a heart catheterization and he was finally   transferred on the 28th.  We also had a Psychiatry consult during   hospitalization.  Their assessment was substance related disorders, both   alcohol-related and depression with mood disorder and anxiety with a generalized   anxiety disorder.  They suggested starting Seroquel 25 mg b.i.d., Trileptal 150   mg g b.i.d. and hydroxyzine 50 mg q.8 hours p.r.n. anxiety.  They suggested   reducing his sertraline to 200 mg.  He had been on 300 to help control his   anxiety.  All other recommendations were followed.    The patient's labs showed an elevated troponin on admit of 0.168.  From that   point, it trended downward daily until the day prior to discharge.  It was   0.076.  He had chest tightness, and a feeling of doom, but no other significant   chest pain.  He had shortness of breath and a feeling of palpitations.  He had   tachycardia in the low  100s.  We did check A1c secondary to some elevated   glucose.  It was 5.1.  Thyroid was normal.  His urine drug screen after we had   already been administering his benzodiazepines was positive for benzodiazepines   and cannabinoids.  It is negative otherwise.  Specifically, it was negative for   cocaine.  Urinalysis was positive for 2+ protein, otherwise clear.  His urine   creatinine was 212.  Urine microalbumin was 324.  Both of these are elevated   indicating kidney injury.  We did an ABG on him while he was on room air and his   pH was 7.472, pCO2 was low at 22.1 secondary to his hyperventilation and his   PO2 was 63.  I attributed this to his hyperventilation as well as his CHF.  His   bicarb was low at 16.1, also consistent with his hyperventilation.  His pulse ox   was 94%.  The patient had no significant anemia.  In fact, he is polycythemic.    Initially, during hospitalization, even though he was in congestive heart   failure, he was volume contracted and we gave him short rounds of IV fluids with   250 mL at a time.  This seemed to help his kidney function and his   polycythemia.  His sedimentation rate was 13 on admit, but by the next day, it   was down to 4.  He was put on a heparin drip after we had another Telemedicine   Cardiology consult, which is on the chart.  D-dimer was positive at 1.01.    However, his CT with PE protocol was negative.  His kidney function/dysfunction   was indicated by his laboratory values as stated above.  His BUN and creatinine   maximized at 37 and 1.75 from that point.  His kidney functions improved and by   the day prior to discharge, his creatinine had come down to 1.29.  He has   elevated liver function secondary to a fatty liver and his alcohol abuse.  His   AST and ALT were 54 and 133 prior to discharge.  His BNP was elevated on admit   at 445.2, and by the time of discharge had maximized at 1469.5 with small doses   of IV fluids and the dobutamine.  It did come down  to 464.5 prior to discharge.    His CPK was 500 on admit, again he was volume contracted and was 404 by 10/24.    He did not complain of any muscle pain.  Multiple x-rays were done showing   pulmonary vascular congestion.  His chest x-ray on the 26th of October after we   had started his dobutamine indicated cardiomegaly, thoracic spondylosis and mild   atelectasis and scarring of the right lung base with the improvement of the   pulmonary vascular congestion.  He was transferred on 10/28 to [facility]   for further cardiac and Nephrology and psychiatric followup.    ASSESSMENT:    1. Congestive heart failure with a decreased ejection fraction to 30/45%.  This   was acute systolic congestive heart failure-improving.  2. Hyperventilation with a low CO2 and alkalosis compensated.  3. Elevated troponin with underlying coronary artery disease indicating   myocardial ischemia.  4. Hypertension with hypotension during hospitalization requiring dobutamine in   the ICU and small doses of IV normal saline.  5. Acute kidney injury-improving.  6. History of alcohol abuse.  7. History of testosterone abuse.  8. Cardiomyopathy with a myopathy.  9. Severe anxiety and underlying depression with panic attacks.  10. Multiple electrolyte abnormalities-improved.  11. Insomnia.  12. Hypoxia secondary to his hypoventilation and congestive heart failure.  13. Rhabdomyolysis-mild.    PLAN:    1. Discharge patient to [facility].  2. Continue care as per their orders.  3. Follow up with me after discharge.        ______________________________  MD HENRRY Lucas/TIM  DD:  10/29/2023  Time:  10:31AM  DT:  10/29/2023  Time:  11:47AM  Job #:  [id number]      DISCHARGE SUMMARY

## 2023-10-29 NOTE — PT/OT/SLP PROGRESS
Pt refused therapy as he states he is back to baseline, his wife is a PT, and he wanted to watch the [employer] game. PT encouraged participation, pt verbalizes appreciating it but still declines.

## 2023-10-29 NOTE — PT/OT/SLP EVAL
Occupational Therapy   Evaluation and Discharge Note    Name: Huang Ramsey  MRN: [id number]  Admitting Diagnosis:   Recent Surgery: * No surgery found *      Recommendations:     Discharge therapy intensity: No Therapy Indicated   Discharge Equipment Recommendations: none  Barriers to discharge:       Assessment:     Huang Ramsey is a 53 y.o. male with a medical diagnosis of HFrEF with suspected cardiogenic shock, NSTEMI, acute sysolic CHF. Skilled OT services are not warranted at this time.    Plan:     OT to sign off as acute OT services are not warranted at this time.  Please re-consult if situation changes during this hospitalization.    Plan of Care Reviewed with: patient    Subjective       Occupational Profile:  Living Environment: pt lives at home with wife and kids   Previous level of function: indep   Roles and Routines: works/drives/works out   Equipment Used at Home: none  Assistance upon Discharge: wife     Pain/Comfort:  Pain Rating 1: 0/10    Patients cultural, spiritual, Orthodox conflicts given the current situation:      Objective:     OT communicated with nrsg prior to session.      Patient was found HOB elevated with   upon OT entry to room.    General Precautions: Standard,    Orthopedic Precautions:    Braces:      Vital Signs: Blood Pressure: 121/85  HR: 99  Sp02: 97  Supplemental 02: 1    Bed Mobility:    Patient completed Supine to Sit with independence  Patient completed Sit to Supine with independence    Functional Mobility/Transfers:  Patient completed Toilet Transfer Step Transfer technique with independence with  no AD  Functional Mobility: no LOB     Activities of Daily Living:  Lower Body Dressing: independence    Toileting: independence      AMPA 6 Click ADL:  AMPAC Total Score:      Functional Cognition:  Intact      Upper Extremity Function:  Right Upper Extremity:   WFL    Left Upper Extremity:  WFL    Balance:   Static standing balance:WFL  Dynamic standing  balance:WFL    Patient Education:  Patient provided with verbal education education regarding OT role/goals/POC.  Understanding was verbalized.     Patient left HOB elevated with all lines intact, call button in reach, and nursing notified    History:     Past Medical History:   Diagnosis Date    Hypertension        No past surgical history on file.    Time Tracking:     OT Date of Treatment:    OT Start Time: 0920  OT Stop Time: 0930  OT Total Time (min): 10 min    Billable Minutes:Evaluation Low Complexity     10/29/2023

## 2023-10-29 NOTE — NURSING
Nurses Note -- 4 Eyes      10/29/2023   11:02 AM      Skin assessed during: Daily Assessment      [x] No Altered Skin Integrity Present    []Prevention Measures Documented      [] Yes- Altered Skin Integrity Present or Discovered   [] LDA Added if Not in Epic (Describe Wound)   [] New Altered Skin Integrity was Present on Admit and Documented in LDA   [] Wound Image Taken    Wound Care Consulted? No    Attending Nurse:  Christopher Ibarra RN/Staff Member:   BHAKTI Pena

## 2023-10-29 NOTE — NURSING
Nurses Note -- 4 Eyes      10/29/2023   4:46 AM      Skin assessed during: Daily Assessment      [x] No Altered Skin Integrity Present    [x]Prevention Measures Documented      [] Yes- Altered Skin Integrity Present or Discovered   [] LDA Added if Not in Epic (Describe Wound)   [] New Altered Skin Integrity was Present on Admit and Documented in LDA   [] Wound Image Taken    Wound Care Consulted? No    Attending Nurse:  Nora Ibarra RN/Staff Member:   BHAKTI Cho

## 2023-10-29 NOTE — PROGRESS NOTES
Cardiology Daily Progress Note    Patient Name: Huang Ramsey  Age: 53 y.o.  : 1970  MRN: [id number]  Admission Date: 10/28/2023      Subjective: No acute cardiac events overnight.Patient states feel better with anxiety medication, denies chest pain, + anxiety     Discussed reasons for poss cause HF- HTN, ETOH abuse, MI,Illict drugs (cocaine), anabolic steroids - stated use prior years younger 20-30s     Remains on dobutamine 7.5 mcg and Heparin gtt     Health Status:  Review of patient's allergies indicates:   Allergen Reactions    Penicillins        Current Facility-Administered Medications   Medication Dose Route Frequency Provider Last Rate Last Admin    diazePAM tablet 10 mg  10 mg Oral Q6H PRN Slick Murillo MD   10 mg at 10/28/23 191    DOBUtamine 1000 mg in D5W 250 mL infusion  0-20 mcg/kg/min Intravenous Continuous Baldomero Ray MD 11 mL/hr at 10/29/23 0512 7.5 mcg/kg/min at 10/29/23 0512    heparin 25,000 units in dextrose 5% (100 units/ml) IV bolus from bag - ADDITIONAL PRN BOLUS - 30 units/kg (max bolus 4000 units)  30 Units/kg (Adjusted) Intravenous PRN Baldomero Ray MD        heparin 25,000 units in dextrose 5% (100 units/ml) IV bolus from bag - ADDITIONAL PRN BOLUS - 60 units/kg (max bolus 4000 units)  48.2 Units/kg (Adjusted) Intravenous PRN Baldomero Ray MD        heparin 25,000 units in dextrose 5% 250 mL (100 units/mL) infusion LOW INTENSITY nomogram - LAF  0-40 Units/kg/hr (Adjusted) Intravenous Continuous Baldomero Ray MD 23.2 mL/hr at 10/29/23 0512 28 Units/kg/hr at 10/29/23 0512    metoprolol tartrate (LOPRESSOR) tablet 25 mg  25 mg Oral BID Tena Matt ACNP   25 mg at 10/28/23 0814    sodium chloride 0.9% flush 10 mL  10 mL Intravenous PRN Baldomero Ray MD           Objective:  Patient Vitals for the past 24 hrs:   BP Temp Temp src Pulse Resp SpO2   10/29/23 0500 (!) 118/90 -- -- 93 (!) 27 96 %   10/29/23 0400 (!)  -- -- 92 17 99 %    10/29/23 0305 (!) 148/89 98.6 °F (37 °C) Oral 91 19 97 %   10/29/23 0200 (!) 140/70 -- -- 97 16 (!) 93 %   10/29/23 0100 (!) 126/95 -- -- 93 19 98 %   10/29/23 0000 111/76 -- -- 93 19 95 %   10/28/23 2310 107/74 98.7 °F (37.1 °C) Oral 94 18 99 %   10/28/23 2300 107/74 -- -- 98 (!) 23 100 %   10/28/23 2200 (!) 128/90 -- -- 96 (!) 23 97 %   10/28/23 2130 122/82 -- -- 99 20 95 %   10/28/23 2100 121/79 -- -- 98 (!) 21 96 %   10/28/23 2000 114/80 -- -- 100 (!) 29 97 %   10/28/23 1930 122/89 -- -- 99 (!) 24 96 %   10/28/23 1915 100/65 98.9 °F (37.2 °C) Oral 100 -- --   10/28/23 1900 100/65 -- -- 97 (!) 27 96 %   10/28/23 1830 -- -- -- 95 -- --   10/28/23 1800 -- -- -- 98 (!) 29 96 %   10/28/23 1745 -- -- -- 98 (!) 28 96 %   10/28/23 1730 111/80 -- -- 98 (!) 26 95 %   10/28/23 1715 -- -- -- 99 (!) 25 95 %   10/28/23 1700 118/78 -- -- 102 (!) 28 (!) 94 %   10/28/23 1645 -- -- -- (!) 203 (!) 28 95 %   10/28/23 1630 (!) 128/53 -- -- (!) 200 (!) 24 (!) 94 %   10/28/23 1615 111/79 -- -- 100 (!) 27 96 %   10/28/23 1600 (!) 92/59 98 °F (36.7 °C) Oral 100 20 97 %   10/28/23 1545 96/66 -- -- 101 (!) 23 (!) 94 %   10/28/23 1515 -- -- -- 102 (!) 27 96 %   10/28/23 1500 115/83 -- -- 100 (!) 27 95 %   10/28/23 1445 -- -- -- 97 17 97 %   10/28/23 1430 117/81 -- -- 96 (!) 25 (!) 93 %   10/28/23 1415 -- -- -- 96 (!) 26 95 %   10/28/23 1400 121/82 -- -- 95 (!) 28 (!) 94 %   10/28/23 1345 -- -- -- 97 (!) 31 95 %   10/28/23 1330 128/88 -- -- 100 (!) 31 95 %   10/28/23 1315 -- -- -- 102 14 97 %   10/28/23 1300 (!) 108/93 -- -- 104 13 97 %   10/28/23 1230 (!) 114/92 -- -- 100 16 97 %   10/28/23 1215 -- -- -- 99 (!) 35 (!) 93 %   10/28/23 1200 120/82 97.3 °F (36.3 °C) Oral 98 20 97 %   10/28/23 1145 -- -- -- 106 (!) 24 (!) 91 %   10/28/23 1130 117/89 -- -- 99 (!) 35 (!) 92 %   10/28/23 1115 (!) 116/90 -- -- 101 (!) 37 (!) 91 %   10/28/23 1100 (!) 116/90 -- -- 93 (!) 33 95 %   10/28/23 1045 -- -- -- 100 (!) 28 96 %   10/28/23 1030 (!) 127/92  -- -- 100 (!) 28 97 %   10/28/23 1015 -- -- -- 97 (!) 26 95 %   10/28/23 1000 109/67 -- -- 98 (!) 26 96 %   10/28/23 0945 -- -- -- 91 20 96 %   10/28/23 0930 116/84 -- -- 96 (!) 24 97 %   10/28/23 0915 -- -- -- 94 20 95 %   10/28/23 0900 110/70 -- -- 92 19 (!) 91 %   10/28/23 0845 -- -- -- 92 18 (!) 92 %   10/28/23 0830 128/78 -- -- 95 (!) 23 95 %   10/28/23 0815 -- -- -- 96 (!) 25 97 %   10/28/23 0800 (!) 142/105 97.7 °F (36.5 °C) Oral 99 (!) 24 (!) 93 %   10/28/23 0730 (!) 141/86 -- -- 94 (!) 28 (!) 94 %   10/28/23 0715 -- -- -- 91 (!) 22 98 %   10/28/23 0714 -- -- -- 92 (!) 30 97 %   10/28/23 0700 103/71 -- -- 91 20 96 %   10/28/23 0600 115/86 -- -- 88 (!) 21 97 %     Recent Results (from the past 24 hour(s))   Comprehensive metabolic panel    Collection Time: 10/28/23  5:40 AM   Result Value Ref Range    Sodium Level 140 136 - 145 mmol/L    Potassium Level 3.5 3.5 - 5.1 mmol/L    Chloride 106 98 - 107 mmol/L    Carbon Dioxide 28 22 - 29 mmol/L    Glucose Level 83 74 - 100 mg/dL    Blood Urea Nitrogen 11.9 8.4 - 25.7 mg/dL    Creatinine 1.14 0.73 - 1.18 mg/dL    Calcium Level Total 8.9 8.4 - 10.2 mg/dL    Protein Total 5.8 (L) 6.4 - 8.3 gm/dL    Albumin Level 3.3 (L) 3.5 - 5.0 g/dL    Globulin 2.5 2.4 - 3.5 gm/dL    Albumin/Globulin Ratio 1.3 1.1 - 2.0 ratio    Bilirubin Total 1.2 <=1.5 mg/dL    Alkaline Phosphatase 52 40 - 150 unit/L    Alanine Aminotransferase 109 (H) 0 - 55 unit/L    Aspartate Aminotransferase 32 5 - 34 unit/L    eGFR >60 mls/min/1.73/m2   Magnesium    Collection Time: 10/28/23  5:40 AM   Result Value Ref Range    Magnesium Level 2.20 1.60 - 2.60 mg/dL   Phosphorus    Collection Time: 10/28/23  5:40 AM   Result Value Ref Range    Phosphorus Level 2.6 2.3 - 4.7 mg/dL   Lipid Panel    Collection Time: 10/28/23  5:40 AM   Result Value Ref Range    Cholesterol Total 159 <=200 mg/dL    HDL Cholesterol 33 (L) 35 - 60 mg/dL    Triglyceride 80 34 - 140 mg/dL    Cholesterol/HDL Ratio 5 0 - 5    Very  Low Density Lipoprotein 16     LDL Cholesterol 110.00 50.00 - 140.00 mg/dL   CK    Collection Time: 10/28/23  5:40 AM   Result Value Ref Range    Creatine Kinase 161 30 - 200 U/L   Protime-INR    Collection Time: 10/28/23  6:30 AM   Result Value Ref Range    PT 13.7 12.5 - 14.5 seconds    INR 1.1 <=1.3   CBC with Differential    Collection Time: 10/28/23  6:30 AM   Result Value Ref Range    WBC 7.43 4.50 - 11.50 x10(3)/mcL    RBC 4.41 (L) 4.70 - 6.10 x10(6)/mcL    Hgb 14.0 14.0 - 18.0 g/dL    Hct 42.7 42.0 - 52.0 %    MCV 96.8 (H) 80.0 - 94.0 fL    MCH 31.7 (H) 27.0 - 31.0 pg    MCHC 32.8 (L) 33.0 - 36.0 g/dL    RDW 14.1 11.5 - 17.0 %    Platelet 159 130 - 400 x10(3)/mcL    MPV 11.2 (H) 7.4 - 10.4 fL    Neut % 68.9 %    Lymph % 15.2 %    Mono % 10.5 %    Eos % 4.3 %    Basophil % 0.7 %    Lymph # 1.13 0.6 - 4.6 x10(3)/mcL    Neut # 5.12 2.1 - 9.2 x10(3)/mcL    Mono # 0.78 0.1 - 1.3 x10(3)/mcL    Eos # 0.32 0 - 0.9 x10(3)/mcL    Baso # 0.05 <=0.2 x10(3)/mcL    IG# 0.03 0 - 0.04 x10(3)/mcL    IG% 0.4 %    NRBC% 0.0 %   APTT    Collection Time: 10/28/23  8:17 AM   Result Value Ref Range    PTT 62.5 (H) 23.2 - 33.7 seconds   PTT Heparin Monitoring    Collection Time: 10/28/23  2:52 PM   Result Value Ref Range    PTT Heparin Monitor 70.1 (H) 23.2 - 33.7 seconds   PTT Heparin Monitoring    Collection Time: 10/28/23  8:34 PM   Result Value Ref Range    PTT Heparin Monitor 83.0 (H) 23.2 - 33.7 seconds   PTT Heparin Monitoring    Collection Time: 10/29/23  1:21 AM   Result Value Ref Range    PTT Heparin Monitor 85.2 (H) 23.2 - 33.7 seconds   Phosphorus    Collection Time: 10/29/23  1:21 AM   Result Value Ref Range    Phosphorus Level 3.0 2.3 - 4.7 mg/dL   Magnesium    Collection Time: 10/29/23  1:21 AM   Result Value Ref Range    Magnesium Level 1.90 1.60 - 2.60 mg/dL   Comprehensive metabolic panel    Collection Time: 10/29/23  1:21 AM   Result Value Ref Range    Sodium Level 139 136 - 145 mmol/L    Potassium Level 3.4 (L)  3.5 - 5.1 mmol/L    Chloride 106 98 - 107 mmol/L    Carbon Dioxide 24 22 - 29 mmol/L    Glucose Level 75 74 - 100 mg/dL    Blood Urea Nitrogen 9.4 8.4 - 25.7 mg/dL    Creatinine 0.98 0.73 - 1.18 mg/dL    Calcium Level Total 9.2 8.4 - 10.2 mg/dL    Protein Total 6.0 (L) 6.4 - 8.3 gm/dL    Albumin Level 3.3 (L) 3.5 - 5.0 g/dL    Globulin 2.7 2.4 - 3.5 gm/dL    Albumin/Globulin Ratio 1.2 1.1 - 2.0 ratio    Bilirubin Total 1.0 <=1.5 mg/dL    Alkaline Phosphatase 55 40 - 150 unit/L    Alanine Aminotransferase 89 (H) 0 - 55 unit/L    Aspartate Aminotransferase 24 5 - 34 unit/L    eGFR >60 mls/min/1.73/m2   APTT    Collection Time: 10/29/23  1:21 AM   Result Value Ref Range    PTT 86.5 (H) 23.2 - 33.7 seconds   CBC with Differential    Collection Time: 10/29/23  1:21 AM   Result Value Ref Range    WBC 6.17 4.50 - 11.50 x10(3)/mcL    RBC 4.64 (L) 4.70 - 6.10 x10(6)/mcL    Hgb 14.6 14.0 - 18.0 g/dL    Hct 44.8 42.0 - 52.0 %    MCV 96.6 (H) 80.0 - 94.0 fL    MCH 31.5 (H) 27.0 - 31.0 pg    MCHC 32.6 (L) 33.0 - 36.0 g/dL    RDW 14.1 11.5 - 17.0 %    Platelet 174 130 - 400 x10(3)/mcL    MPV 11.8 (H) 7.4 - 10.4 fL    Neut % 64.6 %    Lymph % 18.5 %    Mono % 10.7 %    Eos % 5.3 %    Basophil % 0.6 %    Lymph # 1.14 0.6 - 4.6 x10(3)/mcL    Neut # 3.98 2.1 - 9.2 x10(3)/mcL    Mono # 0.66 0.1 - 1.3 x10(3)/mcL    Eos # 0.33 0 - 0.9 x10(3)/mcL    Baso # 0.04 <=0.2 x10(3)/mcL    IG# 0.02 0 - 0.04 x10(3)/mcL    IG% 0.3 %    NRBC% 0.0 %     [unfilled]  Wt Readings from Last 3 Encounters:   10/28/23 98 kg (216 lb 0.8 oz)   10/27/23 96.5 kg (212 lb 11.9 oz)   11/08/21 98.9 kg (218 lb 0.6 oz)       Physical Exam:  General: Alert and oriented, no acute distress  Respiratory: Breath sounds are equal, symmetrical chest wall expansion, NC 2 liters  Cardiovascular: Normal rate, regular rhythm, no murmur, no gallop, no edema, NSR   Integumentary: Warm and dry  Neurologic: Alert and oriented  Psychiatric: Cooperative, appropriate mood and  affect    Assessment:    Acute systolic CHF  -EF 35-40 %, diuresis as needed  - elevated BNP in admit 1469  -low dose BB Metoprolol 25 mg BID   -No ACEi/ARB/ARNI s/t renal function   -Currently on dobutamine gtt - decrease to 5 mcg/kg/min today and hopefully wean tomorrow    -Wean oxygen to room air  -Hx of anabolic steriods in early days      Elevated troponin, mild  -Trend has remained mild and flat (High 0.168, last 0.076)  -Patient denies any CP  - 12 lead from outside - no acute ST or T wave, showed old changes   - remains on heparin gtt  - possible angiogram this week     ETOH Abuse  - last drink 1 week ago   - 1/3 of 5th of whiskey daily for approximately 10 years mostly  has stopped periodically      Elevated d-dimer  - CT chest negative for PE      HTN  -Stable  - monitor   -previously on Amlodipine 5 mg      SALOMÓN- improved  - Nephrology evaluated at [facility]   - monitor last creatinine 1.29- normalized     Anxiety/Panic attacks  -Diazepam oral routine

## 2023-10-29 NOTE — PROGRESS NOTES
"[facility] - 7th Floor ICU  Pulmonary Critical Care Note    Patient Name: Huang Ramsey  MRN: [id number]  Admission Date: 10/28/2023  Hospital Length of Stay: 1 days  Code Status: Full Code  Attending Provider: Elias Ramos MD  Primary Care Provider: Pradeep Do MD     Subjective:     HPI:   Mr. Huang Ramsey is a 54 y/o gentleman with PMHx of HTN, HLD, Hx of Etoh, BPH, Hyperprolactinemia/Hypogonadism on testosterone, and AYAN who presents to [facility] ICU as transfer from [facility] for further cardiological evaluation in the setting of HFrEF w/ cardiogenic shock requiring dobutamine support.     Per chart review; patient initially presented to outside facility with complaints of worsening shortness of breath with exercise (lying flat to perform bench press, difficulty with recovery after boxing) for approximately x2 months.  Also reported previous few days of increased anxiety and worry regarding possible cardiac condition.  Also noted that patient was seen by Dr. Castro recently with future plans performing nuclear stress test.  Upon admission, patient denied any chest pain, palpitations, hemoptysis, or lower extremity swelling.    Upon further interview in ICU; patient reports having a "leaky valve" in which she was referred to Dr. Castro approximately 20 years ago, on/off with follow-ups.  He reports that over approximately the past year he has been experiencing above-mentioned shortness of breath though worsened acutely.  Does report recent x3 pillow orthopnea with intermittent PND.  Has also noticed recent B/L lower extremity edema in the evening.  Currently denies any headache, lightheadedness/dizziness, nausea/vomiting, chest pain, palpitations.  Does have mild shortness of breath at rest.    Of note; patient reports extensive cardiovascular disease amongst his maternal side of family.  Maternal grandfather passed away from MI (at 49 y/o).  Father passed away from MI (at " 31 y/o).  Also had maternal uncle passed away from myocardial infarction.     Family Hx  Mother: N/A  Father: HTN, HLD, MI ( at 31 y/o)  Maternal Grandfather: MI ( at 49 y/o)    Hospital Course/Significant events:  10/24/23: Echocardiogram from Dr Castro was reviewed and shows an EF of ~40%. Patient is on room air and O2 Sats are fine. He is having some anxiety due to hospitalization.   10/26/23: Patient in ICU in stable condition. He was evaluated by psych yesterday due to anxiety issues. He denies any CP or SOB. He is currently resting w/o distress.   10/27/23: Patient transferred from [facility] to [facility] ICU for further cardiological workup    24 Hour Interval History:  N/A    Past Medical History:   Diagnosis Date    Hypertension        No past surgical history on file.    Social History     Socioeconomic History    Marital status:      Social Determinants of Health     Financial Resource Strain: Unknown (10/23/2023)    Overall Financial Resource Strain (CARDIA)     Difficulty of Paying Living Expenses: Patient refused   Food Insecurity: Unknown (10/23/2023)    Hunger Vital Sign     Worried About Running Out of Food in the Last Year: Patient refused     Ran Out of Food in the Last Year: Patient refused   Transportation Needs: Unknown (10/23/2023)    PRAPARE - Transportation     Lack of Transportation (Medical): Patient refused     Lack of Transportation (Non-Medical): Patient refused   Physical Activity: Unknown (10/23/2023)    Exercise Vital Sign     Days of Exercise per Week: Patient refused     Minutes of Exercise per Session: Patient refused   Stress: Unknown (10/23/2023)    [employer] [place] [employer] - Occupational Stress Questionnaire     Feeling of Stress : Patient refused   Social Connections: Unknown (10/23/2023)    Social Connection and Isolation Panel [NHANES]     Frequency of Communication with Friends and Family: Patient refused     Frequency of Social  Gatherings with Friends and Family: Patient refused     Attends Latter-day Services: Patient refused     Active Member of Clubs or Organizations: Patient refused     Attends Club or Organization Meetings: Patient refused     Marital Status: Patient refused   Housing Stability: Unknown (10/23/2023)    Housing Stability Vital Sign     Unable to Pay for Housing in the Last Year: Patient refused     Unstable Housing in the Last Year: Patient refused           Current Outpatient Medications   Medication Instructions    amLODIPine (NORVASC) 5 mg, Oral    anastrozole (ARIMIDEX) 1 mg, Oral    aspirin (ECOTRIN) 81 mg, Oral    busPIRone (BUSPAR) 30 mg, Oral, 2 times daily    diazePAM (VALIUM) 5 mg, Oral, Daily PRN    hydroCHLOROthiazide (HYDRODIURIL) 25 mg, Oral    potassium chloride SA (K-DUR,KLOR-CON M) 10 MEQ tablet 10 mEq, Oral    sertraline (ZOLOFT) 100 MG tablet No dose, route, or frequency recorded.       Current Inpatient Medications   metoprolol tartrate  25 mg Oral BID       Current Intravenous Infusions   DOBUTamine IV infusion (titrating) 5 mcg/kg/min (10/29/23 0621)    heparin (porcine) in D5W 28 Units/kg/hr (10/29/23 0621)         Review of Systems   Constitutional:  Negative for chills and fever.   Eyes:  Negative for blurred vision and double vision.   Respiratory:  Negative for hemoptysis, sputum production and wheezing. Cough: dry, non-productive. Shortness of breath: w/ exercise.   Cardiovascular:  Negative for chest pain, palpitations and claudication. Orthopnea: x3 pillow. Leg swelling: intermittent. PND: intermittent.  Gastrointestinal:  Negative for abdominal pain, blood in stool, constipation, diarrhea, heartburn, melena, nausea and vomiting.   Genitourinary:  Negative for dysuria.   Neurological:  Negative for dizziness, loss of consciousness, weakness and headaches.          Objective:       Intake/Output Summary (Last 24 hours) at 10/29/2023 0911  Last data filed at 10/29/2023 0621  Gross per 24 hour    Intake 1545.97 ml   Output 750 ml   Net 795.97 ml         Vital Signs (Most Recent):  Temp: 98.6 °F (37 °C) (10/29/23 0305)  Pulse: 92 (10/29/23 0600)  Resp: 17 (10/29/23 0600)  BP: (!) 130/97 (10/29/23 0600)  SpO2: 98 % (10/29/23 0600)  Body mass index is 31 kg/m².  Weight: 98 kg (216 lb 0.8 oz) Vital Signs (24h Range):  Temp:  [97.3 °F (36.3 °C)-98.9 °F (37.2 °C)] 98.6 °F (37 °C)  Pulse:  [] 92  Resp:  [13-37] 17  SpO2:  [91 %-100 %] 98 %  BP: ()/(53-97) 130/97     Physical Exam  Constitutional:       General: He is not in acute distress.     Appearance: Normal appearance. He is not toxic-appearing or diaphoretic.   HENT:      Head: Normocephalic and atraumatic.      Mouth/Throat:      Mouth: Mucous membranes are moist.      Pharynx: No oropharyngeal exudate.   Eyes:      Extraocular Movements: Extraocular movements intact.      Pupils: Pupils are equal, round, and reactive to light.   Cardiovascular:      Rate and Rhythm: Normal rate and regular rhythm.      Pulses: Normal pulses.      Comments: No JVD appreciated  Pulmonary:      Effort: Pulmonary effort is normal. No respiratory distress.      Breath sounds: No wheezing or rhonchi. Rales: B/L posterior lower lung fields.  Abdominal:      General: Abdomen is flat. There is no distension.      Palpations: Abdomen is soft.      Tenderness: There is no abdominal tenderness.   Musculoskeletal:         General: No swelling.      Right lower leg: No edema.      Left lower leg: No edema.   Skin:     Capillary Refill: Capillary refill takes less than 2 seconds.      Coloration: Skin is not jaundiced.   Neurological:      General: No focal deficit present.      Mental Status: He is alert and oriented to person, place, and time.      Motor: No weakness.   Psychiatric:         Mood and Affect: Mood normal.         Behavior: Behavior normal.         Thought Content: Thought content normal.         Judgment: Judgment normal.           Lines/Drains/Airways        Peripherally Inserted Central Catheter Line  Duration             PICC Triple Lumen 10/27/23 1452 right basilic 1 day              Peripheral Intravenous Line  Duration                  Peripheral IV - Single Lumen 10/26/23 0930 18 G Anterior;Right Forearm 2 days         Peripheral IV - Single Lumen 10/26/23 1030 20 G Right Antecubital 2 days                    Significant Labs:    Lab Results   Component Value Date    WBC 6.17 10/29/2023    HGB 14.6 10/29/2023    HCT 44.8 10/29/2023    MCV 96.6 (H) 10/29/2023     10/29/2023           BMP  Lab Results   Component Value Date     10/29/2023    K 3.4 (L) 10/29/2023    CHLORIDE 106 10/29/2023    CO2 24 10/29/2023    BUN 9.4 10/29/2023    CREATININE 0.98 10/29/2023    CALCIUM 9.2 10/29/2023    AGAP 14.0 10/22/2023    EGFRNONAA 52 10/15/2021         ABG  Recent Labs   Lab 10/25/23  1238   PH 7.472*   PO2 63*   PCO2 22.1*   HCO3 16.1*         Mechanical Ventilation Support:         Significant Imaging:  I have reviewed the pertinent imaging within the past 24 hours.  No new imaging      Assessment/Plan:     Assessment  HFrEF (EF approximately 40%, 10/24/23) w/ suspected Cardiogenic Shock requiring Dobutamine gtt  NSTEMI w/ RBBB & Left Axis Deviation  Possibly Type II demand in etiology though elevated suspicion for ischemic disease given EF and extensive family cardiac disease  JODIE Score (3)  SALOMÓN - improved  Transaminitis - normalizing  Possibly 2/2 above cardiogenic shock  Hx of HTN, HLD  Hx of Chronic Etoh Abuse  Recent Mild Rhabdo - resolved  Recent Anxiety-Related Difficulties  Hx of Hyperprolactinemia/Hypogonadism on testosterone  Reported AYAN    Plan  - Patient admitted to ICU for close monitoring in the setting of cardiogenic shock requiring dobutamine gtt; pending further cardiological evaluation in a.m.  - C/w dobutamine gtt at this time; current rate at 5 micrograms/milligram per hour, continue to wean as tolerated to maintain goal in MAP> 65   -  C/w Heparin gtt for above NSTEMI  - Consult to CIS placed; pending further evaluation  for possibly anigography  - Continue to monitor renal function/LFTs closely  - CIWA protocol in place with history of chronic EtOH abuse (Valium p.r.n. 2/2 Ativan shortage)  - In setting of above recent anxiety related difficulties and receiving Vistaril/Librium at outside facility; will proceed with Valium p.r.n. under CIWA protocol and hold further Vistaril this time secondary to prolonged QTC of (532)  - Due to patient not having TTE on file; plan to obtain once off dobutamine gtt in addittion to reported history of valvular disease    DVT Prophylaxis: Heparin gtt  GI Prophylaxis: N/A        Elias Ramos MD  Pulmonary Critical Care Medicine  [facility] - 7th Floor ICU  DOS: 10/29/2023

## 2023-10-30 LAB
ALBUMIN SERPL-MCNC: 3.2 G/DL (ref 3.5–5)
ALBUMIN/GLOB SERPL: 1.1 RATIO (ref 1.1–2)
ALP SERPL-CCNC: 50 UNIT/L (ref 40–150)
ALT SERPL-CCNC: 74 UNIT/L (ref 0–55)
APTT PPP: 76.5 SECONDS (ref 23.2–33.7)
AST SERPL-CCNC: 23 UNIT/L (ref 5–34)
BASOPHILS # BLD AUTO: 0.04 X10(3)/MCL
BASOPHILS NFR BLD AUTO: 0.7 %
BILIRUB SERPL-MCNC: 0.8 MG/DL
BUN SERPL-MCNC: 10.5 MG/DL (ref 8.4–25.7)
CALCIUM SERPL-MCNC: 9.5 MG/DL (ref 8.4–10.2)
CHLORIDE SERPL-SCNC: 106 MMOL/L (ref 98–107)
CO2 SERPL-SCNC: 23 MMOL/L (ref 22–29)
CREAT SERPL-MCNC: 1.04 MG/DL (ref 0.73–1.18)
EOSINOPHIL # BLD AUTO: 0.32 X10(3)/MCL (ref 0–0.9)
EOSINOPHIL NFR BLD AUTO: 5.9 %
ERYTHROCYTE [DISTWIDTH] IN BLOOD BY AUTOMATED COUNT: 13.8 % (ref 11.5–17)
GFR SERPLBLD CREATININE-BSD FMLA CKD-EPI: >60 MLS/MIN/1.73/M2
GLOBULIN SER-MCNC: 2.9 GM/DL (ref 2.4–3.5)
GLUCOSE SERPL-MCNC: 104 MG/DL (ref 74–100)
HCT VFR BLD AUTO: 44.8 % (ref 42–52)
HGB BLD-MCNC: 14.9 G/DL (ref 14–18)
IMM GRANULOCYTES # BLD AUTO: 0.02 X10(3)/MCL (ref 0–0.04)
IMM GRANULOCYTES NFR BLD AUTO: 0.4 %
LYMPHOCYTES # BLD AUTO: 1.05 X10(3)/MCL (ref 0.6–4.6)
LYMPHOCYTES NFR BLD AUTO: 19.3 %
MAGNESIUM SERPL-MCNC: 1.7 MG/DL (ref 1.6–2.6)
MCH RBC QN AUTO: 31.6 PG (ref 27–31)
MCHC RBC AUTO-ENTMCNC: 33.3 G/DL (ref 33–36)
MCV RBC AUTO: 94.9 FL (ref 80–94)
MONOCYTES # BLD AUTO: 0.59 X10(3)/MCL (ref 0.1–1.3)
MONOCYTES NFR BLD AUTO: 10.8 %
NEUTROPHILS # BLD AUTO: 3.42 X10(3)/MCL (ref 2.1–9.2)
NEUTROPHILS NFR BLD AUTO: 62.9 %
NRBC BLD AUTO-RTO: 0 %
PHOSPHATE SERPL-MCNC: 2.8 MG/DL (ref 2.3–4.7)
PLATELET # BLD AUTO: 171 X10(3)/MCL (ref 130–400)
PMV BLD AUTO: 11.4 FL (ref 7.4–10.4)
POTASSIUM SERPL-SCNC: 3.6 MMOL/L (ref 3.5–5.1)
PROT SERPL-MCNC: 6.1 GM/DL (ref 6.4–8.3)
RBC # BLD AUTO: 4.72 X10(6)/MCL (ref 4.7–6.1)
SODIUM SERPL-SCNC: 138 MMOL/L (ref 136–145)
WBC # SPEC AUTO: 5.44 X10(3)/MCL (ref 4.5–11.5)

## 2023-10-30 PROCEDURE — 84100 ASSAY OF PHOSPHORUS: CPT | Performed by: STUDENT IN AN ORGANIZED HEALTH CARE EDUCATION/TRAINING PROGRAM

## 2023-10-30 PROCEDURE — 85730 THROMBOPLASTIN TIME PARTIAL: CPT | Performed by: STUDENT IN AN ORGANIZED HEALTH CARE EDUCATION/TRAINING PROGRAM

## 2023-10-30 PROCEDURE — 83735 ASSAY OF MAGNESIUM: CPT | Performed by: STUDENT IN AN ORGANIZED HEALTH CARE EDUCATION/TRAINING PROGRAM

## 2023-10-30 PROCEDURE — 21400001 HC TELEMETRY ROOM

## 2023-10-30 PROCEDURE — 85025 COMPLETE CBC W/AUTO DIFF WBC: CPT | Performed by: STUDENT IN AN ORGANIZED HEALTH CARE EDUCATION/TRAINING PROGRAM

## 2023-10-30 PROCEDURE — 25000003 PHARM REV CODE 250: Performed by: INTERNAL MEDICINE

## 2023-10-30 PROCEDURE — 25000003 PHARM REV CODE 250: Performed by: NURSE PRACTITIONER

## 2023-10-30 PROCEDURE — 25000003 PHARM REV CODE 250

## 2023-10-30 PROCEDURE — 80053 COMPREHEN METABOLIC PANEL: CPT | Performed by: STUDENT IN AN ORGANIZED HEALTH CARE EDUCATION/TRAINING PROGRAM

## 2023-10-30 RX ORDER — VALSARTAN 40 MG/1
40 TABLET ORAL DAILY
Status: DISCONTINUED | OUTPATIENT
Start: 2023-10-30 | End: 2023-11-01 | Stop reason: HOSPADM

## 2023-10-30 RX ORDER — FUROSEMIDE 40 MG/1
40 TABLET ORAL DAILY
Status: DISCONTINUED | OUTPATIENT
Start: 2023-10-30 | End: 2023-10-31

## 2023-10-30 RX ORDER — NAPROXEN SODIUM 220 MG/1
81 TABLET, FILM COATED ORAL DAILY
Status: DISCONTINUED | OUTPATIENT
Start: 2023-10-30 | End: 2023-11-01 | Stop reason: HOSPADM

## 2023-10-30 RX ADMIN — VALSARTAN 40 MG: 40 TABLET, FILM COATED ORAL at 10:10

## 2023-10-30 RX ADMIN — FUROSEMIDE 40 MG: 40 TABLET ORAL at 11:10

## 2023-10-30 RX ADMIN — DIAZEPAM 10 MG: 5 TABLET ORAL at 08:10

## 2023-10-30 RX ADMIN — DIAZEPAM 10 MG: 5 TABLET ORAL at 07:10

## 2023-10-30 RX ADMIN — ASPIRIN 81 MG CHEWABLE TABLET 81 MG: 81 TABLET CHEWABLE at 10:10

## 2023-10-30 RX ADMIN — METOPROLOL TARTRATE 25 MG: 25 TABLET, FILM COATED ORAL at 09:10

## 2023-10-30 RX ADMIN — DIAZEPAM 10 MG: 5 TABLET ORAL at 01:10

## 2023-10-30 NOTE — PLAN OF CARE
10/30/23 0919   Discharge Assessment   Assessment Type Discharge Planning Assessment   Confirmed/corrected address, phone number and insurance Yes   Confirmed Demographics Correct on Facesheet   Source of Information patient   Does patient/caregiver understand observation status Yes   Communicated MAGAN with patient/caregiver Yes;Date not available/Unable to determine   People in Home spouse;child(babar), dependent   Do you expect to return to your current living situation? Yes   Prior to hospitilization cognitive status: Alert/Oriented   Current cognitive status: Alert/Oriented   Equipment Currently Used at Home none   Readmission within 30 days? Yes   Do you currently have service(s) that help you manage your care at home? No   Do you take prescription medications? Yes   Do you have prescription coverage? Yes   Coverage [employer]   Who is going to help you get home at discharge? Family can take home.   How do you get to doctors appointments? car, drives self;family or friend will provide   Are you on dialysis? No   Do you take coumadin? No   Discharge Plan discussed with: Patient   Discharge Plan A Home with family   Discharge Plan B Other  (To be determined)

## 2023-10-30 NOTE — PROGRESS NOTES
Cardiology Daily Progress Note    Patient Name: Huang Ramsey  Age: 53 y.o.  : 1970  MRN: [id number]  Admission Date: 10/28/2023      Subjective: No acute cardiac events overnight. No complaints this morning. Resting comfortably in bed.       Review of Systems   General ROS: negative.  Respiratory ROS: no cough, shortness of breath, or wheezing.  Cardiovascular ROS: no chest pain or dyspnea on exertion.  Gastrointestinal ROS: no abdominal pain, change in bowel habits, or black or bloody stools.  Genito-Urinary ROS: no dysuria, trouble voiding, or hematuria.  Musculoskeletal ROS: negative.  Neurological ROS: negative.      Health Status:  Review of patient's allergies indicates:   Allergen Reactions    Penicillins        Current Facility-Administered Medications   Medication Dose Route Frequency Provider Last Rate Last Admin    diazePAM tablet 10 mg  10 mg Oral Q6H PRN Slick Murillo MD   10 mg at 10/30/23 0807    DOBUtamine 1000 mg in D5W 250 mL infusion  0-20 mcg/kg/min Intravenous Continuous Baldomero Ray MD 7.4 mL/hr at 10/30/23 0513 5 mcg/kg/min at 10/30/23 0513    heparin 25,000 units in dextrose 5% (100 units/ml) IV bolus from bag - ADDITIONAL PRN BOLUS - 30 units/kg (max bolus 4000 units)  30 Units/kg (Adjusted) Intravenous PRN Baldomero Ray MD        heparin 25,000 units in dextrose 5% (100 units/ml) IV bolus from bag - ADDITIONAL PRN BOLUS - 60 units/kg (max bolus 4000 units)  48.2 Units/kg (Adjusted) Intravenous PRN Baldomero Ray MD        heparin 25,000 units in dextrose 5% 250 mL (100 units/mL) infusion LOW INTENSITY nomogram - LAF  0-40 Units/kg/hr (Adjusted) Intravenous Continuous Baldomero Ray MD 23.2 mL/hr at 10/30/23 0513 28 Units/kg/hr at 10/30/23 0513    metoprolol tartrate (LOPRESSOR) tablet 25 mg  25 mg Oral BID Tena Matt, MIRTAP   25 mg at 10/29/23 0800    sodium chloride 0.9% flush 10 mL  10 mL Intravenous PRN Baldomero Ray MD            Objective:  Patient Vitals for the past 24 hrs:   BP Temp Temp src Pulse Resp SpO2   10/30/23 0600 113/83 -- -- 88 18 (!) 93 %   10/30/23 0500 (!) 136/91 -- -- 92 18 97 %   10/30/23 0400 130/79 -- -- 93 (!) 27 95 %   10/30/23 0323 118/81 98.5 °F (36.9 °C) Oral 88 13 (!) 93 %   10/30/23 0300 118/81 -- -- 92 16 (!) 93 %   10/30/23 0200 111/82 -- -- 91 (!) 21 (!) 94 %   10/30/23 0100 131/89 -- -- 91 19 (!) 92 %   10/30/23 0018 -- -- -- 93 20 95 %   10/30/23 0003 -- -- -- 91 20 95 %   10/30/23 0000 99/83 -- -- 94 (!) 29 (!) 90 %   10/29/23 2315 123/85 98.4 °F (36.9 °C) Oral 97 18 (!) 90 %   10/29/23 2300 123/85 -- -- 92 (!) 28 (!) 89 %   10/29/23 2200 108/76 -- -- 97 20 (!) 92 %   10/29/23 2100 115/83 -- -- 93 (!) 25 (!) 88 %   10/29/23 2000 128/79 -- -- 92 (!) 30 (!) 93 %   10/29/23 1938 -- -- -- 84 -- --   10/29/23 1910 126/80 98.9 °F (37.2 °C) Oral 100 (!) 28 (!) 94 %   10/29/23 1600 128/80 98.1 °F (36.7 °C) Oral 94 19 (!) 92 %   10/29/23 1545 -- -- -- 94 (!) 26 (!) 93 %   10/29/23 1530 -- -- -- 100 (!) 25 95 %   10/29/23 1500 (!) 145/97 -- -- 104 17 (!) 93 %   10/29/23 1430 -- -- -- 101 (!) 21 97 %   10/29/23 1415 -- -- -- 107 (!) 28 (!) 92 %   10/29/23 1400 113/88 -- -- (!) 195 (!) 25 (!) 93 %   10/29/23 1345 -- -- -- 94 (!) 26 (!) 92 %   10/29/23 1330 -- -- -- 96 (!) 23 (!) 94 %   10/29/23 1315 -- -- -- 96 (!) 31 (!) 93 %   10/29/23 1300 (!) 124/91 -- -- 94 (!) 29 (!) 91 %   10/29/23 1245 -- -- -- 99 (!) 22 95 %   10/29/23 1200 118/89 97.6 °F (36.4 °C) Oral 100 (!) 23 (!) 91 %   10/29/23 1145 -- -- -- 99 -- --   10/29/23 1130 -- -- -- 99 (!) 25 (!) 94 %   10/29/23 1115 114/87 -- -- 98 13 (!) 94 %   10/29/23 1100 114/87 -- -- 99 (!) 23 96 %   10/29/23 1045 -- -- -- 93 -- --   10/29/23 1000 117/83 -- -- 98 (!) 24 95 %   10/29/23 0945 -- -- -- 99 12 (!) 93 %   10/29/23 0930 -- -- -- 98 (!) 24 97 %   10/29/23 0915 -- -- -- 103 (!) 21 95 %   10/29/23 0900 121/85 -- -- 98 (!) 24 (!) 89 %     Recent Results (from  the past 24 hour(s))   APTT    Collection Time: 10/30/23  1:41 AM   Result Value Ref Range    PTT 76.5 (H) 23.2 - 33.7 seconds   Phosphorus    Collection Time: 10/30/23  1:42 AM   Result Value Ref Range    Phosphorus Level 2.8 2.3 - 4.7 mg/dL   Magnesium    Collection Time: 10/30/23  1:42 AM   Result Value Ref Range    Magnesium Level 1.70 1.60 - 2.60 mg/dL   Comprehensive metabolic panel    Collection Time: 10/30/23  1:42 AM   Result Value Ref Range    Sodium Level 138 136 - 145 mmol/L    Potassium Level 3.6 3.5 - 5.1 mmol/L    Chloride 106 98 - 107 mmol/L    Carbon Dioxide 23 22 - 29 mmol/L    Glucose Level 104 (H) 74 - 100 mg/dL    Blood Urea Nitrogen 10.5 8.4 - 25.7 mg/dL    Creatinine 1.04 0.73 - 1.18 mg/dL    Calcium Level Total 9.5 8.4 - 10.2 mg/dL    Protein Total 6.1 (L) 6.4 - 8.3 gm/dL    Albumin Level 3.2 (L) 3.5 - 5.0 g/dL    Globulin 2.9 2.4 - 3.5 gm/dL    Albumin/Globulin Ratio 1.1 1.1 - 2.0 ratio    Bilirubin Total 0.8 <=1.5 mg/dL    Alkaline Phosphatase 50 40 - 150 unit/L    Alanine Aminotransferase 74 (H) 0 - 55 unit/L    Aspartate Aminotransferase 23 5 - 34 unit/L    eGFR >60 mls/min/1.73/m2   CBC with Differential    Collection Time: 10/30/23  1:42 AM   Result Value Ref Range    WBC 5.44 4.50 - 11.50 x10(3)/mcL    RBC 4.72 4.70 - 6.10 x10(6)/mcL    Hgb 14.9 14.0 - 18.0 g/dL    Hct 44.8 42.0 - 52.0 %    MCV 94.9 (H) 80.0 - 94.0 fL    MCH 31.6 (H) 27.0 - 31.0 pg    MCHC 33.3 33.0 - 36.0 g/dL    RDW 13.8 11.5 - 17.0 %    Platelet 171 130 - 400 x10(3)/mcL    MPV 11.4 (H) 7.4 - 10.4 fL    Neut % 62.9 %    Lymph % 19.3 %    Mono % 10.8 %    Eos % 5.9 %    Basophil % 0.7 %    Lymph # 1.05 0.6 - 4.6 x10(3)/mcL    Neut # 3.42 2.1 - 9.2 x10(3)/mcL    Mono # 0.59 0.1 - 1.3 x10(3)/mcL    Eos # 0.32 0 - 0.9 x10(3)/mcL    Baso # 0.04 <=0.2 x10(3)/mcL    IG# 0.02 0 - 0.04 x10(3)/mcL    IG% 0.4 %    NRBC% 0.0 %     [unfilled]  Wt Readings from Last 3 Encounters:   10/28/23 98 kg (216 lb 0.8 oz)   10/27/23 96.5  kg (212 lb 11.9 oz)   11/08/21 98.9 kg (218 lb 0.6 oz)       Physical Exam:  General: Alert and oriented, no acute distress.  Neck: No carotid bruit, no jugular venous distention.  Respiratory: Breath sounds are equal, symmetrical chest wall expansion. Breath sounds are clear, on room air .  Cardiovascular: Normal rate, regular rhythm. No murmur. No gallop. No edema noted. Patient is NSR  on tele.  Integumentary: Clean, warm, dry, and intact.  Neurologic: Alert and oriented.   Psychiatric: Cooperative, appropriate mood and affect.        Assessment/Plan:    Acute systolic CHF  - Echo  (in office) with LVEF EF 35-40%, moderate MR, moderate PHTN, elevated RV volume, aortic root 4.5 cm   - continue lopressor   - start valsartan 40 mg daily with plans to transition to Entresto if he tolerates  - consider SGLT2-inhibitor soon as well  - d/c Dobutamine gtt     Elevated troponin, Type II given above  - Trend has remained mild and flat (High 0.168, last 0.076)  - 12 lead from outside - no acute ST or T wave, showed old changes   - d/c heparin gtt  - possible angiogram tomorrow - make NPO after midnight  - consent has been signed      ETOH Abuse, h/o anabolic steroids  - needs full cessation     Elevated d-dimer, pulmonary nodule  - CT chest negative for PE, 6 mm calcified nodule, possible edema  - will need outpatient follow up as indicated for pulmonary nodule with PCP     HTN  - stable  - GDMT for HF as above     SALOMÓN  - resolved  - Nephrology evaluated at [facility]      Anxiety/Panic attacks  -Diazepam oral routine          *Patient of Dr. Castro.          Joi Interiano, APRN, FNP-C  [facility]

## 2023-10-30 NOTE — NURSING
Nurses Note -- 4 Eyes      10/30/2023   3:39 AM      Skin assessed during: Daily Assessment      [x] No Altered Skin Integrity Present    [x]Prevention Measures Documented      [] Yes- Altered Skin Integrity Present or Discovered   [] LDA Added if Not in Epic (Describe Wound)   [] New Altered Skin Integrity was Present on Admit and Documented in LDA   [] Wound Image Taken    Wound Care Consulted? No    Attending Nurse:  Nora Ibarra RN/Staff Member:   BHAKTI Kenney

## 2023-10-30 NOTE — PT/OT/SLP PROGRESS
Physical Therapy      Patient Name:  Huang Ramsey   MRN:  [id number]    Patient not seen today for PT eval. Pt resting in bed upon arrival. Pt stating that he has been getting up and walking around room/to bathroom independently. Pt does not feel like he needs PT services at this time. PT to sign off, please re-consult as necessary.

## 2023-10-30 NOTE — PROGRESS NOTES
[facility] - 7th Floor ICU  Pulmonary/Critical Care  Progress Note  10/30/2023    Patient Name: Huang Ramsey  MRN: [id number]  Admission Date: 10/28/2023  Code Status: Full Code      Subjective:     HPI:  The patient is a 53-year-old male who has a history of hypertension, hyperlipidemia, chronic ETOH abuse, stage II CKD, obstructive sleep apnea, hyperprolactinemia/hypogonadism on chronic testosterone replacement.  He initially presented to [facility] on 10/22/2023 with complaints of worsening dyspnea over the previous approximate 2 months.  Recent outpatient echocardiogram had revealed moderate LV dilation with EF 35-40%, LAE, RV dilation, and RVSP 63 mm Hg.  PE protocol CT demonstrated no evidence of pulmonary embolus.  He was placed on a dobutamine infusion.  Cardiology consultation was obtained at that facility, recommending transfer to [facility] for further ischemic evaluation including LHC.  He arrived at this facility 10/28/2023.  Cardiology consult was obtained on arrival, patient is known to Dr. James Castro.    Hospital Course:      24hr Interval History:  I have extensively reviewed this patient's hospital stay thus far, as being seen by me for the first time this a.m..  He is afebrile.  He remains sinus rhythm, denies chest pain or current shortness of breath.  Dobutamine was discontinued this a.m..  He remains on full anticoagulation with unfractionated heparin.  Cardiology plans probable left heart catheterization within the next 24-48 hours.    Scheduled Medications:   metoprolol tartrate  25 mg Oral BID     PRN Medications:  diazePAM, heparin (PORCINE), heparin (PORCINE), sodium chloride 0.9%  Continuous Infusions:   DOBUTamine IV infusion (titrating) 5 mcg/kg/min (10/30/23 0513)    heparin (porcine) in D5W 28 Units/kg/hr (10/30/23 0513)       Past Medical History:   Diagnosis Date    Hypertension        No past surgical history on file.    Objective:     Input/output:    Intake/Output  Summary (Last 24 hours) at 10/30/2023 0755  Last data filed at 10/30/2023 0513  Gross per 24 hour   Intake 1179.66 ml   Output 900 ml   Net 279.66 ml       Vital Signs (Most Recent):  Temp: 98.5 °F (36.9 °C) (10/30/23 0323)  Pulse: 88 (10/30/23 0600)  Resp: 18 (10/30/23 0600)  BP: 113/83 (10/30/23 0600)  SpO2: (!) 93 % (10/30/23 0600)  Body mass index is 31 kg/m².  Weight: 98 kg (216 lb 0.8 oz) Vital Signs (24h Range):  Temp:  [97.6 °F (36.4 °C)-98.9 °F (37.2 °C)] 98.5 °F (36.9 °C)  Pulse:  [] 88  Resp:  [12-31] 18  SpO2:  [86 %-97 %] 93 %  BP: ()/(76-97) 113/83     Physical Exam  Constitutional:       Appearance: Normal appearance.   HENT:      Mouth/Throat:      Mouth: Mucous membranes are moist.      Pharynx: Oropharynx is clear.   Eyes:      Pupils: Pupils are equal, round, and reactive to light.   Cardiovascular:      Rate and Rhythm: Normal rate and regular rhythm.      Heart sounds: No murmur heard.  Pulmonary:      Breath sounds: Normal breath sounds. No wheezing, rhonchi or rales.   Abdominal:      General: Bowel sounds are normal.      Palpations: Abdomen is soft.   Musculoskeletal:      Right lower leg: No edema.      Left lower leg: No edema.   Lymphadenopathy:      Cervical: No cervical adenopathy.   Skin:     General: Skin is warm and dry.   Neurological:      Mental Status: He is alert and oriented to person, place, and time.         Lines/Drains/Airways       Peripherally Inserted Central Catheter Line  Duration             PICC Triple Lumen 10/27/23 1452 right basilic 2 days              Peripheral Intravenous Line  Duration                  Peripheral IV - Single Lumen 10/26/23 0930 18 G Anterior;Right Forearm 3 days                      ABGs:  Lab Results   Component Value Date    PH 7.472 (H) 10/25/2023    PO2 63 (L) 10/25/2023    PCO2 22.1 (LL) 10/25/2023    POCSATURATED 94 10/25/2023         Significant Labs:    Lab Results   Component Value Date    WBC 5.44 10/30/2023    HGB 14.9  10/30/2023    HCT 44.8 10/30/2023    MCV 94.9 (H) 10/30/2023     10/30/2023         Recent Labs   Lab 10/30/23  0141 10/30/23  0142   NA  --  138   K  --  3.6   CO2  --  23   BUN  --  10.5   CREATININE  --  1.04   CALCIUM  --  9.5   MG  --  1.70   PHOS  --  2.8   AST  --  23   ALT  --  74*   ALKPHOS  --  50   ALBUMIN  --  3.2*   PTT 76.5*  --      Imaging:   None today    Assessment:     Probable acute on chronic heart failure with dilated cardiomyopathy and moderate decreased LVSF (EF 35-40%)  Reported obstructive sleep apnea, post UPPP.  No CPAP usage postoperative.  Hypertension and hyperlipidemia  Stage 2 chronic kidney disease    Plan:     Continue full dose unfractionated heparin for time being  As per cardiology concerning timing of left heart catheterization            Liza Storey MD, FCCP  Pulmonary/Critical Care

## 2023-10-31 LAB
ANION GAP SERPL CALC-SCNC: 9 MEQ/L
BUN SERPL-MCNC: 15.7 MG/DL (ref 8.4–25.7)
CALCIUM SERPL-MCNC: 9.6 MG/DL (ref 8.4–10.2)
CHLORIDE SERPL-SCNC: 105 MMOL/L (ref 98–107)
CO2 SERPL-SCNC: 23 MMOL/L (ref 22–29)
CREAT SERPL-MCNC: 1.27 MG/DL (ref 0.73–1.18)
CREAT/UREA NIT SERPL: 12
GFR SERPLBLD CREATININE-BSD FMLA CKD-EPI: >60 MLS/MIN/1.73/M2
GLUCOSE SERPL-MCNC: 114 MG/DL (ref 74–100)
MAGNESIUM SERPL-MCNC: 1.5 MG/DL (ref 1.6–2.6)
PHOSPHATE SERPL-MCNC: 3.2 MG/DL (ref 2.3–4.7)
POTASSIUM SERPL-SCNC: 3.2 MMOL/L (ref 3.5–5.1)
SODIUM SERPL-SCNC: 137 MMOL/L (ref 136–145)

## 2023-10-31 PROCEDURE — 25000003 PHARM REV CODE 250: Performed by: NURSE PRACTITIONER

## 2023-10-31 PROCEDURE — C1760 CLOSURE DEV, VASC: HCPCS | Performed by: STUDENT IN AN ORGANIZED HEALTH CARE EDUCATION/TRAINING PROGRAM

## 2023-10-31 PROCEDURE — 99153 MOD SED SAME PHYS/QHP EA: CPT | Performed by: STUDENT IN AN ORGANIZED HEALTH CARE EDUCATION/TRAINING PROGRAM

## 2023-10-31 PROCEDURE — 25000003 PHARM REV CODE 250: Performed by: STUDENT IN AN ORGANIZED HEALTH CARE EDUCATION/TRAINING PROGRAM

## 2023-10-31 PROCEDURE — C1887 CATHETER, GUIDING: HCPCS | Performed by: STUDENT IN AN ORGANIZED HEALTH CARE EDUCATION/TRAINING PROGRAM

## 2023-10-31 PROCEDURE — 80048 BASIC METABOLIC PNL TOTAL CA: CPT | Performed by: INTERNAL MEDICINE

## 2023-10-31 PROCEDURE — C1769 GUIDE WIRE: HCPCS | Performed by: STUDENT IN AN ORGANIZED HEALTH CARE EDUCATION/TRAINING PROGRAM

## 2023-10-31 PROCEDURE — 25000003 PHARM REV CODE 250

## 2023-10-31 PROCEDURE — 63600175 PHARM REV CODE 636 W HCPCS: Performed by: STUDENT IN AN ORGANIZED HEALTH CARE EDUCATION/TRAINING PROGRAM

## 2023-10-31 PROCEDURE — 25000003 PHARM REV CODE 250: Performed by: INTERNAL MEDICINE

## 2023-10-31 PROCEDURE — 11000001 HC ACUTE MED/SURG PRIVATE ROOM

## 2023-10-31 PROCEDURE — C1894 INTRO/SHEATH, NON-LASER: HCPCS | Performed by: STUDENT IN AN ORGANIZED HEALTH CARE EDUCATION/TRAINING PROGRAM

## 2023-10-31 PROCEDURE — 63600175 PHARM REV CODE 636 W HCPCS: Performed by: INTERNAL MEDICINE

## 2023-10-31 PROCEDURE — 93458 L HRT ARTERY/VENTRICLE ANGIO: CPT | Performed by: STUDENT IN AN ORGANIZED HEALTH CARE EDUCATION/TRAINING PROGRAM

## 2023-10-31 PROCEDURE — 93799 UNLISTED CV SVC/PROCEDURE: CPT | Mod: LD | Performed by: STUDENT IN AN ORGANIZED HEALTH CARE EDUCATION/TRAINING PROGRAM

## 2023-10-31 PROCEDURE — 84100 ASSAY OF PHOSPHORUS: CPT | Performed by: INTERNAL MEDICINE

## 2023-10-31 PROCEDURE — 27201423 OPTIME MED/SURG SUP & DEVICES STERILE SUPPLY: Performed by: STUDENT IN AN ORGANIZED HEALTH CARE EDUCATION/TRAINING PROGRAM

## 2023-10-31 PROCEDURE — 85025 COMPLETE CBC W/AUTO DIFF WBC: CPT | Performed by: STUDENT IN AN ORGANIZED HEALTH CARE EDUCATION/TRAINING PROGRAM

## 2023-10-31 PROCEDURE — 99152 MOD SED SAME PHYS/QHP 5/>YRS: CPT | Performed by: STUDENT IN AN ORGANIZED HEALTH CARE EDUCATION/TRAINING PROGRAM

## 2023-10-31 PROCEDURE — 25500020 PHARM REV CODE 255: Performed by: STUDENT IN AN ORGANIZED HEALTH CARE EDUCATION/TRAINING PROGRAM

## 2023-10-31 PROCEDURE — 83735 ASSAY OF MAGNESIUM: CPT | Performed by: INTERNAL MEDICINE

## 2023-10-31 DEVICE — ANGIO-SEAL VIP VASCULAR CLOSURE DEVICE
Type: IMPLANTABLE DEVICE | Site: GROIN | Status: FUNCTIONAL
Brand: ANGIO-SEAL

## 2023-10-31 RX ORDER — FENTANYL CITRATE 50 UG/ML
INJECTION, SOLUTION INTRAMUSCULAR; INTRAVENOUS
Status: DISCONTINUED | OUTPATIENT
Start: 2023-10-31 | End: 2023-10-31 | Stop reason: HOSPADM

## 2023-10-31 RX ORDER — HEPARIN SODIUM 1000 [USP'U]/ML
INJECTION, SOLUTION INTRAVENOUS; SUBCUTANEOUS
Status: DISCONTINUED | OUTPATIENT
Start: 2023-10-31 | End: 2023-10-31 | Stop reason: HOSPADM

## 2023-10-31 RX ORDER — LIDOCAINE HYDROCHLORIDE 10 MG/ML
INJECTION INFILTRATION; PERINEURAL
Status: DISCONTINUED | OUTPATIENT
Start: 2023-10-31 | End: 2023-10-31 | Stop reason: HOSPADM

## 2023-10-31 RX ORDER — FUROSEMIDE 10 MG/ML
40 INJECTION INTRAMUSCULAR; INTRAVENOUS
Status: DISCONTINUED | OUTPATIENT
Start: 2023-10-31 | End: 2023-11-01 | Stop reason: HOSPADM

## 2023-10-31 RX ORDER — MAGNESIUM SULFATE HEPTAHYDRATE 40 MG/ML
2 INJECTION, SOLUTION INTRAVENOUS ONCE
Status: COMPLETED | OUTPATIENT
Start: 2023-10-31 | End: 2023-10-31

## 2023-10-31 RX ORDER — MIDAZOLAM HYDROCHLORIDE 1 MG/ML
INJECTION INTRAMUSCULAR; INTRAVENOUS
Status: DISCONTINUED | OUTPATIENT
Start: 2023-10-31 | End: 2023-10-31 | Stop reason: HOSPADM

## 2023-10-31 RX ORDER — POTASSIUM CHLORIDE 20 MEQ/1
40 TABLET, EXTENDED RELEASE ORAL ONCE
Status: COMPLETED | OUTPATIENT
Start: 2023-10-31 | End: 2023-10-31

## 2023-10-31 RX ADMIN — VALSARTAN 40 MG: 40 TABLET, FILM COATED ORAL at 10:10

## 2023-10-31 RX ADMIN — POTASSIUM CHLORIDE 40 MEQ: 1500 TABLET, EXTENDED RELEASE ORAL at 10:10

## 2023-10-31 RX ADMIN — FUROSEMIDE 40 MG: 40 TABLET ORAL at 10:10

## 2023-10-31 RX ADMIN — METOPROLOL TARTRATE 25 MG: 25 TABLET, FILM COATED ORAL at 09:10

## 2023-10-31 RX ADMIN — DIAZEPAM 10 MG: 5 TABLET ORAL at 01:10

## 2023-10-31 RX ADMIN — ASPIRIN 81 MG CHEWABLE TABLET 81 MG: 81 TABLET CHEWABLE at 10:10

## 2023-10-31 RX ADMIN — DIAZEPAM 10 MG: 5 TABLET ORAL at 03:10

## 2023-10-31 RX ADMIN — FUROSEMIDE 40 MG: 10 INJECTION, SOLUTION INTRAMUSCULAR; INTRAVENOUS at 08:10

## 2023-10-31 RX ADMIN — DIAZEPAM 10 MG: 5 TABLET ORAL at 09:10

## 2023-10-31 RX ADMIN — MAGNESIUM SULFATE 2 G: 2 INJECTION INTRAVENOUS at 10:10

## 2023-10-31 RX ADMIN — DIAZEPAM 10 MG: 5 TABLET ORAL at 10:10

## 2023-10-31 RX ADMIN — METOPROLOL TARTRATE 25 MG: 25 TABLET, FILM COATED ORAL at 10:10

## 2023-10-31 NOTE — PROGRESS NOTES
Cardiology Daily Progress Note    Patient Name: Huang Ramsey  Age: 53 y.o.  : 1970  MRN: [id number]  Admission Date: 10/28/2023      Subjective: No acute cardiac events overnight. Tolerated [facility] well today. No chest pains.       Review of Systems   General ROS: negative.  Respiratory ROS: no cough, shortness of breath, or wheezing.  Cardiovascular ROS: no chest pain or dyspnea on exertion.  Gastrointestinal ROS: no abdominal pain, change in bowel habits, or black or bloody stools.  Genito-Urinary ROS: no dysuria, trouble voiding, or hematuria.  Musculoskeletal ROS: negative.  Neurological ROS: negative.      Health Status:  Review of patient's allergies indicates:   Allergen Reactions    Penicillins        Current Facility-Administered Medications   Medication Dose Route Frequency Provider Last Rate Last Admin    aspirin chewable tablet 81 mg  81 mg Oral Daily Dennis Interianoca A, FNP   81 mg at 10/31/23 1007    diazePAM tablet 10 mg  10 mg Oral Q6H PRN Slick Murillo MD   10 mg at 10/31/23 1524    furosemide injection 40 mg  40 mg Intravenous Q12H Triston Blunt MD        metoprolol tartrate (LOPRESSOR) tablet 25 mg  25 mg Oral BID Tena Matt H, ACNP   25 mg at 10/31/23 1007    sodium chloride 0.9% flush 10 mL  10 mL Intravenous PRN Baldomero Ray MD        valsartan tablet 40 mg  40 mg Oral Daily Jaydon Joi A, FNP   40 mg at 10/31/23 1008       Objective:  Patient Vitals for the past 24 hrs:   BP Temp Temp src Pulse Resp SpO2   10/31/23 1643 101/76 97.5 °F (36.4 °C) Oral 92 -- 98 %   10/31/23 1007 114/77 -- -- 88 -- --   10/31/23 0700 121/81 97.5 °F (36.4 °C) Oral 92 18 97 %   10/31/23 0345 114/77 97.4 °F (36.3 °C) Oral 88 -- 96 %   10/30/23 2315 113/87 98.8 °F (37.1 °C) Oral 97 18 (!) 94 %   10/30/23 1930 108/75 98.9 °F (37.2 °C) Oral 99 (!) 21 95 %   10/30/23 1730 -- -- -- 92 (!) 21 --   10/30/23 1715 -- -- -- 93 (!) 27 --       Recent Results (from the past 24 hour(s))   Basic  Metabolic Panel    Collection Time: 10/31/23  1:33 AM   Result Value Ref Range    Sodium Level 137 136 - 145 mmol/L    Potassium Level 3.2 (L) 3.5 - 5.1 mmol/L    Chloride 105 98 - 107 mmol/L    Carbon Dioxide 23 22 - 29 mmol/L    Glucose Level 114 (H) 74 - 100 mg/dL    Blood Urea Nitrogen 15.7 8.4 - 25.7 mg/dL    Creatinine 1.27 (H) 0.73 - 1.18 mg/dL    BUN/Creatinine Ratio 12     Calcium Level Total 9.6 8.4 - 10.2 mg/dL    Anion Gap 9.0 mEq/L    eGFR >60 mls/min/1.73/m2   Phosphorus    Collection Time: 10/31/23  1:33 AM   Result Value Ref Range    Phosphorus Level 3.2 2.3 - 4.7 mg/dL   Magnesium    Collection Time: 10/31/23  1:33 AM   Result Value Ref Range    Magnesium Level 1.50 (L) 1.60 - 2.60 mg/dL     [unfilled]  Wt Readings from Last 3 Encounters:   10/28/23 98 kg (216 lb 0.8 oz)   10/27/23 96.5 kg (212 lb 11.9 oz)   11/08/21 98.9 kg (218 lb 0.6 oz)       Physical Exam:  General: Alert and oriented, no acute distress.  Neck: No carotid bruit, no jugular venous distention.  Respiratory: Breath sounds are equal, symmetrical chest wall expansion. Breath sounds are clear, on room air .  Cardiovascular: Normal rate, regular rhythm. No murmur. No gallop. No edema noted. Patient is NSR  on tele.  Integumentary: Clean, warm, dry, and intact.  Neurologic: Alert and oriented.   Psychiatric: Cooperative, appropriate mood and affect.        Assessment/Plan:    Acute systolic CHF - Non ischemic by cath.   - Echo  (in office) with LVEF EF 35-40%, moderate MR, moderate PHTN, elevated RV volume, aortic root 4.5 cm   - continue lopressor   - start valsartan 40 mg daily with plans to transition to Entresto if he tolerates  - consider SGLT2-inhibitor soon as well  - EDP 32 mmHg, continue IV lasix for at least a few more doses.      Elevated troponin, Type II given above  - Trend has remained mild and flat (High 0.168, last 0.076)  - 12 lead from outside - no acute ST or T wave, showed old changes   - nonischemic, mid  LAD lesion iFR 0.92      ETOH Abuse, h/o anabolic steroids  - needs full cessation     Elevated d-dimer, pulmonary nodule  - CT chest negative for PE, 6 mm calcified nodule, possible edema  - will need outpatient follow up as indicated for pulmonary nodule with PCP     HTN  - stable  - GDMT for HF as above     SALOMÓN  - resolved  - Nephrology evaluated at [facility]      Anxiety/Panic attacks  -Diazepam oral routine          *Patient of Dr. Castro.        Triston Blunt MD  [facility]

## 2023-10-31 NOTE — NURSING
Nurses Note -- 4 Eyes      10/31/2023   4:17 AM      Skin assessed during: Transfer      [x] No Altered Skin Integrity Present    []Prevention Measures Documented      [] Yes- Altered Skin Integrity Present or Discovered   [] LDA Added if Not in Epic (Describe Wound)   [] New Altered Skin Integrity was Present on Admit and Documented in LDA   [] Wound Image Taken    Wound Care Consulted? No    Attending Nurse:  Joanie Ibarra RN/Staff Member:   Jennifer GUTIERRES LPN

## 2023-11-01 VITALS
WEIGHT: 216.06 LBS | HEIGHT: 70 IN | TEMPERATURE: 99 F | SYSTOLIC BLOOD PRESSURE: 101 MMHG | BODY MASS INDEX: 30.93 KG/M2 | OXYGEN SATURATION: 94 % | DIASTOLIC BLOOD PRESSURE: 70 MMHG | RESPIRATION RATE: 18 BRPM | HEART RATE: 85 BPM

## 2023-11-01 PROBLEM — I50.23 ACUTE ON CHRONIC SYSTOLIC CONGESTIVE HEART FAILURE: Status: ACTIVE | Noted: 2023-10-24

## 2023-11-01 LAB
BASOPHILS # BLD AUTO: 0.06 X10(3)/MCL
BASOPHILS NFR BLD AUTO: 0.8 %
EOSINOPHIL # BLD AUTO: 0.32 X10(3)/MCL (ref 0–0.9)
EOSINOPHIL NFR BLD AUTO: 4.3 %
ERYTHROCYTE [DISTWIDTH] IN BLOOD BY AUTOMATED COUNT: 13.6 % (ref 11.5–17)
HCT VFR BLD AUTO: 50.8 % (ref 42–52)
HGB BLD-MCNC: 17.1 G/DL (ref 14–18)
IMM GRANULOCYTES # BLD AUTO: 0.01 X10(3)/MCL (ref 0–0.04)
IMM GRANULOCYTES NFR BLD AUTO: 0.1 %
LYMPHOCYTES # BLD AUTO: 0.96 X10(3)/MCL (ref 0.6–4.6)
LYMPHOCYTES NFR BLD AUTO: 13 %
MCH RBC QN AUTO: 31.4 PG (ref 27–31)
MCHC RBC AUTO-ENTMCNC: 33.7 G/DL (ref 33–36)
MCV RBC AUTO: 93.4 FL (ref 80–94)
MONOCYTES # BLD AUTO: 0.73 X10(3)/MCL (ref 0.1–1.3)
MONOCYTES NFR BLD AUTO: 9.9 %
NEUTROPHILS # BLD AUTO: 5.33 X10(3)/MCL (ref 2.1–9.2)
NEUTROPHILS NFR BLD AUTO: 71.9 %
NRBC BLD AUTO-RTO: 0 %
PLATELET # BLD AUTO: 229 X10(3)/MCL (ref 130–400)
PMV BLD AUTO: 11.7 FL (ref 7.4–10.4)
RBC # BLD AUTO: 5.44 X10(6)/MCL (ref 4.7–6.1)
WBC # SPEC AUTO: 7.41 X10(3)/MCL (ref 4.5–11.5)

## 2023-11-01 PROCEDURE — 25000003 PHARM REV CODE 250: Performed by: NURSE PRACTITIONER

## 2023-11-01 PROCEDURE — 25000003 PHARM REV CODE 250

## 2023-11-01 PROCEDURE — 63600175 PHARM REV CODE 636 W HCPCS: Performed by: STUDENT IN AN ORGANIZED HEALTH CARE EDUCATION/TRAINING PROGRAM

## 2023-11-01 RX ORDER — SACUBITRIL AND VALSARTAN 24; 26 MG/1; MG/1
1 TABLET, FILM COATED ORAL 2 TIMES DAILY
Qty: 60 TABLET | Refills: 2 | Status: SHIPPED | OUTPATIENT
Start: 2023-11-01 | End: 2024-01-30

## 2023-11-01 RX ORDER — METOPROLOL SUCCINATE 25 MG/1
25 TABLET, EXTENDED RELEASE ORAL DAILY
Qty: 30 TABLET | Refills: 11 | Status: SHIPPED | OUTPATIENT
Start: 2023-11-01 | End: 2024-10-31

## 2023-11-01 RX ORDER — FUROSEMIDE 40 MG/1
40 TABLET ORAL DAILY
Qty: 30 TABLET | Refills: 11 | Status: SHIPPED | OUTPATIENT
Start: 2023-11-01 | End: 2024-10-31

## 2023-11-01 RX ADMIN — DIAZEPAM 10 MG: 5 TABLET ORAL at 08:11

## 2023-11-01 RX ADMIN — ASPIRIN 81 MG CHEWABLE TABLET 81 MG: 81 TABLET CHEWABLE at 08:11

## 2023-11-01 RX ADMIN — METOPROLOL TARTRATE 25 MG: 25 TABLET, FILM COATED ORAL at 08:11

## 2023-11-01 RX ADMIN — FUROSEMIDE 40 MG: 10 INJECTION, SOLUTION INTRAMUSCULAR; INTRAVENOUS at 08:11

## 2023-11-01 NOTE — DISCHARGE SUMMARY
ADMISSION INFORMATION     Admit Date: 10/28/2023 Primary Care Physician: Pradeep Do MD   Admitting Physician: Umair Gillis MD Primary Care Phone: [phone]    Consulting Provider(s):     DISCHARGE INFORMATION     Discharge Date: 11/1/2023  Primary Discharge Diagnosis: Acute on chronic systolic congestive heart failure   Discharge Physician: Sanchez Castro MD Secondary Discharge Diagnosis:   Active Hospital Problems    Diagnosis  POA    *Acute on chronic systolic congestive heart failure [I50.23]  Yes      Resolved Hospital Problems   No resolved problems to display.            Discharge Condition: good     Discharge Disposition: Home     DETAILS OF HOSPITAL STAY     Presenting Problem: Acute on chronic CHF     Hospital Course: Presented with acute CHF required inotrope to augment diuresis. Had an elevated troponin on admit and a recently failed outpatient stress. C with nonobstructive CAD and elevated LVEDP. Continued IV diuresis and symptoms improved. Will discharge on GDMT as listed. Close follow up with Dr Castro.   Significant Diagnostic Studies/Procedures:   Complications: not detected      Physical Exam   Constitutional:Oriented to person, place, and time and well-developed, well-nourished, and in no distress.   Neck: Normal range of motion. Neck supple.   Cardiovascular: Normal rate, regular rhythm and normal heart sounds. NSR   Pulmonary/Chest: Effort normal and breath sounds normal, but diminished at the bases otherwise clear to auscultation patient is on room air  Abdominal: Soft. Bowel sounds are normal. There is no tenderness.   Musculoskeletal: Normal range of motion.   Neurological: Alert and oriented to person, place, and time. Gait normal.   Skin: Skin is warm and dry.   Psychiatric: Affect normal.   DISCHARGE PLAN     Activity as tolerated     Cardiac diet        Medication List        START taking these medications      empagliflozin 10 mg tablet  Commonly known as:  JARDIANCE  Take 1 tablet (10 mg total) by mouth once daily.     ENTRESTO 24-26 mg per tablet  Generic drug: sacubitriL-valsartan  Take 1 tablet by mouth 2 (two) times daily.     furosemide 40 MG tablet  Commonly known as: LASIX  Take 1 tablet (40 mg total) by mouth once daily.     metoprolol succinate 25 MG 24 hr tablet  Commonly known as: TOPROL-XL  Take 1 tablet (25 mg total) by mouth once daily.            CONTINUE taking these medications      aspirin 81 MG EC tablet  Commonly known as: ECOTRIN     busPIRone 30 MG Tab  Commonly known as: BUSPAR     diazePAM 5 MG tablet  Commonly known as: VALIUM     sertraline 100 MG tablet  Commonly known as: ZOLOFT            STOP taking these medications      amLODIPine 5 MG tablet  Commonly known as: NORVASC     anastrozole 1 mg Tab  Commonly known as: ARIMIDEX     hydroCHLOROthiazide 25 MG tablet  Commonly known as: HYDRODIURIL     potassium chloride SA 10 MEQ tablet  Commonly known as: K-DUR,KLOR-CON M               Where to Get Your Medications        These medications were sent to [facility] - [address]  [address] [place] [address] Floor [address] [address]      Phone: [phone]   empagliflozin 10 mg tablet  ENTRESTO 24-26 mg per tablet  furosemide 40 MG tablet  metoprolol succinate 25 MG 24 hr tablet        Follow with in 1 week with Dr Castro     No future appointments.     Time Spent 35 min   Components of this note were documented using voice recognition systems; and are subject to errors not corrected at proof reading.  Please contact the author for any clarifications.

## 2023-11-02 LAB
BACTERIA BLD CULT: NORMAL
BACTERIA BLD CULT: NORMAL

## 2023-11-22 NOTE — PHYSICIAN QUERY
PT Name: Huang Ramsey  MR #: [id number]     DOCUMENTATION CLARIFICATION      CDS/: Breann Cox RN CDIS          Contact information: [email or web address]    This form is a permanent document in the medical record.    Query Date: November 22, 2023    By submitting this query, we are merely seeking further clarification of documentation to reflect the severity of illness of your patient. Please utilize your independent clinical judgment when addressing the question(s) below.     The Medical Record contains the following:   Indicators   Supporting Clinical Findings Location in Medical Record    Chest Pain, Angina      Coronary Artery Disease     x EKG 12 lead from outside - no acute ST or T wave, showed old changes  Cards notes 10/31    x Troponin Trend has remained mild and flat (High 0.168, last 0.076)  Cards notes 10/31     Echo Results     x Angiography   There was non-obstructive coronary artery disease..    The Prox LAD lesion was 50% stenosed. 0.92 by iFR    The pre-procedure left ventricular end diastolic pressure was 35. Angio 10/31    x Documentation of acute cardiac condition Elevated troponin, Type II given above  Cards notes 10/31     Medication/Treatment      Other        Provider, please clarify the diagnosis related to the above documentation:  [   ] NSTEMI/Myocardial Infarction Type 2 due to (please specify): __________   [ x  ] Demand Ischemia   [   ] Demand Ischemia without Acute Myocardial Infarction   [   ] Other Cardiac Diagnosis (please specify): _______________         Please document in your progress notes daily for the duration of treatment until resolved, and include in your discharge summary.    Form No. [id number]

## 2023-12-08 DIAGNOSIS — I10 ESSENTIAL HYPERTENSION, MALIGNANT: Primary | ICD-10-CM

## 2024-01-11 ENCOUNTER — HOSPITAL ENCOUNTER (OUTPATIENT)
Dept: RADIOLOGY | Facility: HOSPITAL | Age: 54
Discharge: HOME | End: 2024-01-11
Attending: INTERNAL MEDICINE
Payer: COMMERCIAL

## 2024-01-11 DIAGNOSIS — I10 ESSENTIAL HYPERTENSION, MALIGNANT: ICD-10-CM

## 2024-01-11 PROCEDURE — 93306 TTE W/DOPPLER COMPLETE: CPT

## 2024-01-12 LAB
AV PEAK GRADIENT: 3 MMHG
AV REGURGITATION PRESSURE HALF TIME: 490.94 MS
AV VALVE AREA BY VELOCITY RATIO: 2.75 CM²
AV VELOCITY RATIO: 0.69
CV ECHO LV RWT: 0.39 CM
DOP CALC AO PEAK VEL: 0.89 M/S
DOP CALC LVOT AREA: 4 CM2
DOP CALC LVOT DIAMETER: 2.26 CM
DOP CALC LVOT PEAK VEL: 0.61 M/S
DOP CALC MV VTI: 16.4 CM
E WAVE DECELERATION TIME: 213.45 MSEC
E/A RATIO: 0.85
ECHO LV POSTERIOR WALL: 1.27 CM (ref 0.6–1.1)
FRACTIONAL SHORTENING: 6 % (ref 28–44)
INTERVENTRICULAR SEPTUM: 1.61 CM (ref 0.6–1.1)
LEFT ATRIUM SIZE: 0 CM
LEFT INTERNAL DIMENSION IN SYSTOLE: 6.17 CM (ref 2.1–4)
LEFT VENTRICLE DIASTOLIC VOLUME: 218.86 ML
LEFT VENTRICLE SYSTOLIC VOLUME: 191.72 ML
LEFT VENTRICULAR INTERNAL DIMENSION IN DIASTOLE: 6.54 CM (ref 3.5–6)
LEFT VENTRICULAR MASS: 463.33 G
MV MEAN GRADIENT: 1 MMHG
MV PEAK A VEL: 0.82 M/S
MV PEAK E VEL: 0.7 M/S
MV PEAK GRADIENT: 3 MMHG
MV STENOSIS PRESSURE HALF TIME: 61.9 MS
MV VALVE AREA P 1/2 METHOD: 3.55 CM2
OHS LV EJECTION FRACTION SIMPSONS BIPLANE MOD: 24 %
PISA AR MAX VEL: 3.78 M/S
PISA MRMAX VEL: 5 M/S
PISA TR MAX VEL: 2.8 M/S
PV PEAK GRADIENT: 1 MMHG
PV PEAK VELOCITY: 0.59 M/S
RA PRESSURE ESTIMATED: 3 MMHG
RIGHT VENTRICULAR END-DIASTOLIC DIMENSION: 3.55 CM
RV TB RVSP: 6 MMHG
TR MAX PG: 31 MMHG
TV REST PULMONARY ARTERY PRESSURE: 34 MMHG

## 2024-07-30 DIAGNOSIS — I50.9 CONGESTIVE HEART DISEASE: Primary | ICD-10-CM

## 2024-07-30 DIAGNOSIS — R00.0 TACHYCARDIA, UNSPECIFIED: ICD-10-CM

## 2024-07-30 DIAGNOSIS — I34.0 MITRAL REGURGITATION: ICD-10-CM

## 2024-08-22 ENCOUNTER — HOSPITAL ENCOUNTER (OUTPATIENT)
Dept: RADIOLOGY | Facility: HOSPITAL | Age: 54
Discharge: HOME | End: 2024-08-22
Attending: INTERNAL MEDICINE
Payer: COMMERCIAL

## 2024-08-22 DIAGNOSIS — I34.0 MITRAL REGURGITATION: ICD-10-CM

## 2024-08-22 DIAGNOSIS — I50.9 CONGESTIVE HEART DISEASE: ICD-10-CM

## 2024-08-22 DIAGNOSIS — R00.0 TACHYCARDIA, UNSPECIFIED: ICD-10-CM

## 2024-08-22 LAB
AV MEAN GRADIENT: 0 MMHG
AV PEAK GRADIENT: 1 MMHG
AV REGURGITATION PRESSURE HALF TIME: 470.6 MS
AV VALVE AREA BY VELOCITY RATIO: 10.26 CM²
AV VELOCITY RATIO: 2.08
CV ECHO LV RWT: 0.48 CM
DOP CALC AO PEAK VEL: 0.53 M/S
DOP CALC AO VTI: 0 CM
DOP CALC LVOT AREA: 4.9 CM2
DOP CALC LVOT DIAMETER: 2.51 CM
DOP CALC LVOT PEAK VEL: 1.1 M/S
DOP CALC MV VTI: 20.2 CM
E WAVE DECELERATION TIME: 173.58 MSEC
E/A RATIO: 0.81
ECHO LV POSTERIOR WALL: 1.44 CM (ref 0.6–1.1)
FRACTIONAL SHORTENING: 16 % (ref 28–44)
INTERVENTRICULAR SEPTUM: 1.25 CM (ref 0.6–1.1)
LEFT ATRIUM SIZE: 4.3 CM
LEFT INTERNAL DIMENSION IN SYSTOLE: 5.06 CM (ref 2.1–4)
LEFT VENTRICLE DIASTOLIC VOLUME: 179.8 ML
LEFT VENTRICLE SYSTOLIC VOLUME: 121.78 ML
LEFT VENTRICULAR INTERNAL DIMENSION IN DIASTOLE: 6 CM (ref 3.5–6)
LEFT VENTRICULAR MASS: 366.88 G
LVED V (TEICH): 179.8 ML
LVES V (TEICH): 121.78 ML
MV MEAN GRADIENT: 1 MMHG
MV PEAK A VEL: 0.62 M/S
MV PEAK E VEL: 0.5 M/S
MV PEAK GRADIENT: 2 MMHG
MV STENOSIS PRESSURE HALF TIME: 50.34 MS
MV VALVE AREA P 1/2 METHOD: 4.37 CM2
OHS CV RV/LV RATIO: 0.57 CM
PISA AR MAX VEL: 4.64 M/S
PISA MRMAX VEL: 5.04 M/S
PISA TR MAX VEL: 2.23 M/S
PV PEAK GRADIENT: 2 MMHG
PV PEAK VELOCITY: 0.66 M/S
RIGHT VENTRICULAR END-DIASTOLIC DIMENSION: 3.43 CM
TR MAX PG: 20 MMHG

## 2024-08-22 PROCEDURE — 93306 TTE W/DOPPLER COMPLETE: CPT

## (undated) DEVICE — GUIDEWIRE INQWIRE SE 3MM JTIP

## (undated) DEVICE — CANNULA ADULT NASAL 7FT

## (undated) DEVICE — PAD DEFIB RADIOTRANSPARENT

## (undated) DEVICE — SYS WASTE FLD DISPOSAL 1400ML

## (undated) DEVICE — GUIDEWIRE OMNI STR TIP 185CM

## (undated) DEVICE — SHEATH INTRODUCER 5FR 10CM

## (undated) DEVICE — KIT MINI STK MAX COAX 5FR 10CM

## (undated) DEVICE — DRAPE ANGIO BRACH 38X44IN

## (undated) DEVICE — VALVE GUARDIAN II HEMOSTASIS

## (undated) DEVICE — GUIDE LAUNCHER 6FR EBU 4.0

## (undated) DEVICE — CATH MULTIPACK EXPO 6FR

## (undated) DEVICE — SHEATH INTRODUCER 6FR 11CM

## (undated) DEVICE — SET ANGIO ACIST CVI ANGIOTOUCH

## (undated) DEVICE — Device